# Patient Record
Sex: MALE | Race: WHITE | NOT HISPANIC OR LATINO | Employment: OTHER | URBAN - METROPOLITAN AREA
[De-identification: names, ages, dates, MRNs, and addresses within clinical notes are randomized per-mention and may not be internally consistent; named-entity substitution may affect disease eponyms.]

---

## 2018-03-16 ENCOUNTER — OFFICE VISIT (OUTPATIENT)
Dept: GASTROENTEROLOGY | Facility: MEDICAL CENTER | Age: 78
End: 2018-03-16
Payer: MEDICARE

## 2018-03-16 VITALS
BODY MASS INDEX: 35.22 KG/M2 | SYSTOLIC BLOOD PRESSURE: 124 MMHG | TEMPERATURE: 98 F | DIASTOLIC BLOOD PRESSURE: 64 MMHG | HEIGHT: 67 IN | WEIGHT: 224.4 LBS | HEART RATE: 66 BPM

## 2018-03-16 DIAGNOSIS — C49.A4 GIST (GASTROINTESTINAL STROMA TUMOR), MALIGNANT, COLON (HCC): ICD-10-CM

## 2018-03-16 DIAGNOSIS — K29.80 DUODENITIS: Primary | ICD-10-CM

## 2018-03-16 DIAGNOSIS — I35.0 AORTIC VALVE STENOSIS, ETIOLOGY OF CARDIAC VALVE DISEASE UNSPECIFIED: ICD-10-CM

## 2018-03-16 PROCEDURE — 99214 OFFICE O/P EST MOD 30 MIN: CPT | Performed by: INTERNAL MEDICINE

## 2018-03-16 RX ORDER — ARIPIPRAZOLE 5 MG/1
5 TABLET ORAL
COMMUNITY

## 2018-03-16 RX ORDER — ENALAPRIL MALEATE 10 MG/1
TABLET ORAL
COMMUNITY

## 2018-03-16 NOTE — LETTER
March 17, 2018     Kristi Hew, [address]    Patient: Aruna Thomas   YOB: 1940   Date of Visit: 3/16/2018       Dear Dr Altagracia Martinez:    Thank you for referring Jose Alfredo Torrez to me for evaluation  Below are my notes for this consultation  If you have questions, please do not hesitate to call me  I look forward to following your patient along with you           Sincerely,        Julianne Boyer MD        CC: No Recipients

## 2018-03-16 NOTE — PROGRESS NOTES
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 on MRI and CT scan performed at [facility]   This was performed last year  Decided to just was approximately 2 5 cm  It was recommended to have endoscopic ultrasound for further evaluation  He presents here for this  He does have extensive cardiac history with moderate aortic stenosis  He is seeing a cardiologist soon for further evaluation of this as well  After extensive discussion and reviewing records from [facility] and from the [facility] and discussing risk and benefit of undergoing endoscopic ultrasound he would like to defer this at this time  We did discuss that he needs further evaluation after cardiac issues have resolved or bowel replacement has taken place  We further discussed that this may be technically difficult due to the fact that he will be on anticoagulation but the biopsies may not have to be performed if this is a classical just on endoscopic ultrasound  EGD which was performed in January of this year was also reviewed  This did not identify any masses in the greater curvature  This did identify duodenitis and he needs further evaluation to rule out celiac disease  Follow-up in 6 months with us  Duodenitis- he denies any NSAID use the etiology of this is unclear may be related to GERD versus celiac disease  It may also be a nonspecific finding  He does have mild villi blunting on endoscopic evaluation with biopsy  Would recommend repeat EGD at the time of endoscopic ultrasound  When he will follow up with us in 6 months we could discuss this further  Will recommend checking for celiac serologies at this time  If they are positive I would recommend avoiding gluten      Aortic stenosis- he is planning on seeing a cardiologist for this so would defer endoscopic ultrasound for now   ______________________________________________________________________    HPI:      Nhung Thompson is a 50-year-old male who is a poor historian has most of his care at the [address] [place] [address] and recently also had multiple imaging study performed at [facility] presents here for evaluation of chest, and duodenitis  He states he is up-to-date on his colonoscopy evaluation but those records are not available to us  He had multiple imaging studies done MRI/CT scan of abdomen pelvis which identified a submucosal lesion possibly located in the greater curvature of the stomach  This was measuring approximately 2 5 cm and 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 GIST  He is asymptomatic at this time  He also underwent EGD evaluation in January 2018 and PET scan for further evaluation of this lesion  PET scan did not identify any lesion in the greater curvature and EGD also did not identify lesion  He does need endoscopic ultrasound for further evaluation of a possible submucosal lesion  Unfortunately he does have extensive cardiac issues including moderate aortic stenosis with a preserved EF  He is following with Cardiology for further evaluation considering possible valve replacement  After extensive discussion regarding risk and benefit endoscopic ultrasound he would like to defer this until cardiology evaluation has taken place  He is up-to-date on his colonoscopy evaluation and will follow with the [address] [place] [address] for this  REVIEW OF SYSTEMS:    CONSTITUTIONAL:  Well appearing  HEENT: No earache or tinnitus  Denies hearing loss or visual disturbances  CARDIOVASCULAR: No chest pain or palpitations  RESPIRATORY: Denies any cough, hemoptysis, shortness of breath or dyspnea on exertion  GASTROINTESTINAL: As noted in the History of Present Illness  GENITOURINARY: No problems with urination  Denies any hematuria or dysuria  NEUROLOGIC: No dizziness or vertigo, denies headaches  MUSCULOSKELETAL: Denies any muscle or joint pain     SKIN: Denies skin rashes or itching  ENDOCRINE: Denies excessive thirst  Denies intolerance to heat or cold  PSYCHOSOCIAL: Denies depression or anxiety  Denies any recent memory loss  Historical Information   History reviewed  No pertinent past medical history  History reviewed  No pertinent surgical history  Social History   History   Alcohol use Not on file     History   Drug use: Unknown     History   Smoking Status    Current Every Day Smoker   Smokeless Tobacco    Never Used     History reviewed  No pertinent family history  Meds/Allergies       Current Outpatient Prescriptions:     ARIPiprazole (ABILIFY) 5 mg tablet    enalapril (VASOTEC) 10 mg tablet    No Known Allergies        Objective     Blood pressure 124/64, pulse 66, temperature 98 °F (36 7 °C), temperature source Oral, height 5' 7" (1 702 m), weight 102 kg (224 lb 6 4 oz)  PHYSICAL EXAM:      General Appearance:   Alert, cooperative, no distress   HEENT:   Normocephalic, atraumatic, anicteric      Neck:  Supple, symmetrical, trachea midline   Lungs:   Clear to auscultation bilaterally; no rales, rhonchi or wheezing; respirations unlabored    Heart[de-identified]   2/6 systolic ejection murmur   Abdomen:   Soft, non-tender, non-distended; normal bowel sounds; no masses, no organomegaly    Genitalia:   Deferred    Rectal:   Deferred    Extremities:  No cyanosis, clubbing or edema    Pulses:  2+ and symmetric    Skin:  No jaundice, rashes, or lesions    Lymph nodes:  No palpable cervical lymphadenopathy        Lab Results:   No visits with results within 1 Day(s) from this visit  Latest known visit with results is:   Conversion Encounter Outpatient on 01/15/2015   Component Date Value    PSA 01/15/2015 1 2          Radiology Results:   7/2017 - MRI examination of abdomen before and after administration of 9 5 mL  of Gadavist  MRCP images were also obtained  Comparison was performed with  prior CT scan of July 30, 2017  Findings:  There are motion artifacts, somewhat limiting the quality of the  study  Liver, pancreas, spleen, adrenal glands, and kidneys are unremarkable  There is a subcentimeter unenhancing cystic lesion in the posterior aspect of  the lower pole of the right kidney  No retroperitoneal lymphadenopathy  No  ascites  There is a 2 1 8 centimeters slightly T2 bright, T1 isointense diffuse  enhancing soft tissue mass adjacent to the greater curvature of the stomach,  which appears otherwise unremarkable  No evidence of stomach wall involvement  or intraluminal extension  Other close by organs are unremarkable  MRCP: There are several large and stones within the gallbladder, largest stone  2 1 x 1 5 cm in the gallbladder neck, corresponding to the mass seen on prior  CT  No intra-or extrahepatic biliary dilation  CBD is normal in caliber and  extend to the major papillar without obstruction or filling defect  The main  pancreatic duct is of normal caliber pancreas divisum  There is a tiny cystic  lesion in the tail of the pancreas  6/2017- No evidence of metastatic disease within the abdomen or pelvis  2   A 6 mm mural nodule in the gallbladder wall which may represent a polyp  This could be further evaluated with ultrasound or MRI if clinically indicated  3   A 2 6 cm soft tissue mass arising from the greater curvature wall of the  stomach likely representing a GIST  Further evaluation is warranted  4   Cholelithiasis without evidence of cholecystitis  5   Dense aortic valve calcifications which can contribute to aortic stenosis

## 2018-05-23 ENCOUNTER — HOSPITAL ENCOUNTER (EMERGENCY)
Facility: HOSPITAL | Age: 78
Discharge: LEFT BEFORE DISCHARGE | End: 2018-05-24
Attending: EMERGENCY MEDICINE
Payer: MEDICARE

## 2018-05-23 DIAGNOSIS — R06.00 DYSPNEA AND RESPIRATORY ABNORMALITIES: Primary | ICD-10-CM

## 2018-05-23 DIAGNOSIS — R06.89 DYSPNEA AND RESPIRATORY ABNORMALITIES: Primary | ICD-10-CM

## 2018-05-23 DIAGNOSIS — Z53.29 LEFT AGAINST MEDICAL ADVICE: ICD-10-CM

## 2018-05-23 DIAGNOSIS — J18.9 PNEUMONITIS: ICD-10-CM

## 2018-05-24 ENCOUNTER — APPOINTMENT (EMERGENCY)
Dept: RADIOLOGY | Facility: HOSPITAL | Age: 78
End: 2018-05-24
Payer: MEDICARE

## 2018-05-24 VITALS
RESPIRATION RATE: 21 BRPM | TEMPERATURE: 98.3 F | HEART RATE: 88 BPM | SYSTOLIC BLOOD PRESSURE: 149 MMHG | DIASTOLIC BLOOD PRESSURE: 69 MMHG | OXYGEN SATURATION: 90 %

## 2018-05-24 LAB
ALBUMIN SERPL BCP-MCNC: 3.7 G/DL (ref 3.5–5)
ALP SERPL-CCNC: 79 U/L (ref 46–116)
ALT SERPL W P-5'-P-CCNC: 36 U/L (ref 12–78)
ANION GAP SERPL CALCULATED.3IONS-SCNC: 5 MMOL/L (ref 4–13)
AST SERPL W P-5'-P-CCNC: 25 U/L (ref 5–45)
BASOPHILS # BLD AUTO: 0.04 THOUSANDS/ΜL (ref 0–0.1)
BASOPHILS NFR BLD AUTO: 0 % (ref 0–1)
BILIRUB SERPL-MCNC: 0.66 MG/DL (ref 0.2–1)
BUN SERPL-MCNC: 18 MG/DL (ref 5–25)
CALCIUM SERPL-MCNC: 8.6 MG/DL (ref 8.3–10.1)
CHLORIDE SERPL-SCNC: 102 MMOL/L (ref 100–108)
CO2 SERPL-SCNC: 30 MMOL/L (ref 21–32)
CREAT SERPL-MCNC: 1.06 MG/DL (ref 0.6–1.3)
EOSINOPHIL # BLD AUTO: 0.28 THOUSAND/ΜL (ref 0–0.61)
EOSINOPHIL NFR BLD AUTO: 3 % (ref 0–6)
ERYTHROCYTE [DISTWIDTH] IN BLOOD BY AUTOMATED COUNT: 13.9 % (ref 11.6–15.1)
GFR SERPL CREATININE-BSD FRML MDRD: 68 ML/MIN/1.73SQ M
GLUCOSE SERPL-MCNC: 160 MG/DL (ref 65–140)
HCT VFR BLD AUTO: 43.8 % (ref 36.5–49.3)
HGB BLD-MCNC: 14.3 G/DL (ref 12–17)
LYMPHOCYTES # BLD AUTO: 0.89 THOUSANDS/ΜL (ref 0.6–4.47)
LYMPHOCYTES NFR BLD AUTO: 10 % (ref 14–44)
MCH RBC QN AUTO: 28.4 PG (ref 26.8–34.3)
MCHC RBC AUTO-ENTMCNC: 32.6 G/DL (ref 31.4–37.4)
MCV RBC AUTO: 87 FL (ref 82–98)
MONOCYTES # BLD AUTO: 0.76 THOUSAND/ΜL (ref 0.17–1.22)
MONOCYTES NFR BLD AUTO: 8 % (ref 4–12)
NEUTROPHILS # BLD AUTO: 7.27 THOUSANDS/ΜL (ref 1.85–7.62)
NEUTS SEG NFR BLD AUTO: 79 % (ref 43–75)
PLATELET # BLD AUTO: 280 THOUSANDS/UL (ref 149–390)
PMV BLD AUTO: 9 FL (ref 8.9–12.7)
POTASSIUM SERPL-SCNC: 3.9 MMOL/L (ref 3.5–5.3)
PROT SERPL-MCNC: 6.9 G/DL (ref 6.4–8.2)
RBC # BLD AUTO: 5.03 MILLION/UL (ref 3.88–5.62)
SODIUM SERPL-SCNC: 137 MMOL/L (ref 136–145)
WBC # BLD AUTO: 9.24 THOUSAND/UL (ref 4.31–10.16)

## 2018-05-24 PROCEDURE — 80053 COMPREHEN METABOLIC PANEL: CPT | Performed by: EMERGENCY MEDICINE

## 2018-05-24 PROCEDURE — 71046 X-RAY EXAM CHEST 2 VIEWS: CPT

## 2018-05-24 PROCEDURE — 96374 THER/PROPH/DIAG INJ IV PUSH: CPT

## 2018-05-24 PROCEDURE — 94640 AIRWAY INHALATION TREATMENT: CPT

## 2018-05-24 PROCEDURE — 85025 COMPLETE CBC W/AUTO DIFF WBC: CPT | Performed by: EMERGENCY MEDICINE

## 2018-05-24 PROCEDURE — 99283 EMERGENCY DEPT VISIT LOW MDM: CPT

## 2018-05-24 RX ORDER — IPRATROPIUM BROMIDE AND ALBUTEROL SULFATE 2.5; .5 MG/3ML; MG/3ML
3 SOLUTION RESPIRATORY (INHALATION) ONCE
Status: COMPLETED | OUTPATIENT
Start: 2018-05-24 | End: 2018-05-24

## 2018-05-24 RX ORDER — GUAIFENESIN 600 MG
600 TABLET, EXTENDED RELEASE 12 HR ORAL EVERY 12 HOURS SCHEDULED
Qty: 20 TABLET | Refills: 0 | Status: SHIPPED | OUTPATIENT
Start: 2018-05-24 | End: 2019-02-05 | Stop reason: ALTCHOICE

## 2018-05-24 RX ORDER — AZITHROMYCIN 250 MG/1
500 TABLET, FILM COATED ORAL ONCE
Status: COMPLETED | OUTPATIENT
Start: 2018-05-24 | End: 2018-05-24

## 2018-05-24 RX ORDER — METHYLPREDNISOLONE SODIUM SUCCINATE 125 MG/2ML
125 INJECTION, POWDER, LYOPHILIZED, FOR SOLUTION INTRAMUSCULAR; INTRAVENOUS ONCE
Status: COMPLETED | OUTPATIENT
Start: 2018-05-24 | End: 2018-05-24

## 2018-05-24 RX ORDER — PREDNISONE 20 MG/1
TABLET ORAL
Qty: 12 TABLET | Refills: 0 | Status: SHIPPED | OUTPATIENT
Start: 2018-05-24 | End: 2019-02-05 | Stop reason: ALTCHOICE

## 2018-05-24 RX ORDER — AZITHROMYCIN 250 MG/1
TABLET, FILM COATED ORAL
Qty: 6 TABLET | Refills: 0 | Status: SHIPPED | OUTPATIENT
Start: 2018-05-24 | End: 2018-05-29

## 2018-05-24 RX ORDER — IPRATROPIUM BROMIDE AND ALBUTEROL SULFATE 2.5; .5 MG/3ML; MG/3ML
SOLUTION RESPIRATORY (INHALATION)
Status: DISCONTINUED
Start: 2018-05-24 | End: 2018-05-24 | Stop reason: HOSPADM

## 2018-05-24 RX ORDER — METHYLPREDNISOLONE SODIUM SUCCINATE 125 MG/2ML
INJECTION, POWDER, LYOPHILIZED, FOR SOLUTION INTRAMUSCULAR; INTRAVENOUS
Status: DISCONTINUED
Start: 2018-05-24 | End: 2018-05-24 | Stop reason: HOSPADM

## 2018-05-24 RX ORDER — ALBUTEROL SULFATE 90 UG/1
2 AEROSOL, METERED RESPIRATORY (INHALATION) ONCE
Status: COMPLETED | OUTPATIENT
Start: 2018-05-24 | End: 2018-05-24

## 2018-05-24 RX ADMIN — IPRATROPIUM BROMIDE AND ALBUTEROL SULFATE 3 ML: .5; 3 SOLUTION RESPIRATORY (INHALATION) at 01:01

## 2018-05-24 RX ADMIN — METHYLPREDNISOLONE SODIUM SUCCINATE 125 MG: 125 INJECTION, POWDER, FOR SOLUTION INTRAMUSCULAR; INTRAVENOUS at 01:08

## 2018-05-24 RX ADMIN — ALBUTEROL SULFATE 2 PUFF: 90 AEROSOL, METERED RESPIRATORY (INHALATION) at 03:04

## 2018-05-24 RX ADMIN — AZITHROMYCIN 500 MG: 250 TABLET, FILM COATED ORAL at 03:03

## 2018-05-24 NOTE — ED PROVIDER NOTES
History  Chief Complaint   Patient presents with    URI     onset  of symptoms on friday, cough ,congestion ,white mucous,       Pt gives hx of cough/congestion and sore throat 5 days   Today "felt hot"  No chills  No N/V/D  No CP or Abd pain  PMH Cancer; AR; daily smoker  Pt relates about 1 month ago had pneumothorax after a procedure  Pt appears dyspneic and O2 Sat 86-92 on RA  History provided by:  Patient  Shortness of Breath   Severity:  Moderate  Progression:  Worsening  Chronicity:  New  Context: activity and URI    Context: not animal exposure and not fumes    Relieved by:  Rest  Worsened by:  Exertion  Ineffective treatments:  None tried  Associated symptoms: sore throat and wheezing    Associated symptoms: no abdominal pain, no chest pain, no claudication, no diaphoresis, no ear pain, no headaches, no hemoptysis, no neck pain, no rash, no sputum production, no syncope and no vomiting    Risk factors: hx of cancer and tobacco use    Risk factors: no hx of PE/DVT and no prolonged immobilization        Prior to Admission Medications   Prescriptions Last Dose Informant Patient Reported? Taking? ARIPiprazole (ABILIFY) 5 mg tablet  Self Yes No   Sig: Take 5 mg by mouth   enalapril (VASOTEC) 10 mg tablet  Self Yes No   Sig: Take by mouth      Facility-Administered Medications: None       Past Medical History:   Diagnosis Date    Cardiac disease     needs aortic valve    Prostate disease        Past Surgical History:   Procedure Laterality Date    BRAIN SURGERY      tumor removal 15 yrs ago       History reviewed  No pertinent family history  I have reviewed and agree with the history as documented  Social History   Substance Use Topics    Smoking status: Current Every Day Smoker    Smokeless tobacco: Never Used    Alcohol use No        Review of Systems   Constitutional: Positive for activity change  Negative for appetite change, chills and diaphoresis     HENT: Positive for congestion and sore throat  Negative for drooling, ear pain, facial swelling, mouth sores, rhinorrhea and sneezing  Eyes: Negative  Negative for discharge, redness and visual disturbance  Respiratory: Positive for chest tightness, shortness of breath and wheezing  Negative for hemoptysis, sputum production and choking  Cardiovascular: Negative  Negative for chest pain, palpitations, claudication, leg swelling and syncope  Gastrointestinal: Negative  Negative for abdominal pain, blood in stool, diarrhea, nausea and vomiting  Genitourinary: Negative  Negative for dysuria, flank pain and hematuria  Musculoskeletal: Negative  Negative for arthralgias, joint swelling, myalgias, neck pain and neck stiffness  Skin: Negative  Negative for rash and wound  Neurological: Negative  Negative for dizziness, tremors, seizures, syncope, facial asymmetry, speech difficulty and headaches  Psychiatric/Behavioral: Negative  Negative for confusion, hallucinations and self-injury  The patient is not nervous/anxious  All other systems reviewed and are negative  Physical Exam  Physical Exam   Constitutional: He is oriented to person, place, and time  He appears well-developed and well-nourished  He is cooperative  Non-toxic appearance  He does not have a sickly appearance  HENT:   Head: Normocephalic and atraumatic  Right Ear: Tympanic membrane and ear canal normal    Left Ear: Tympanic membrane and ear canal normal    Nose: Nose normal    Mouth/Throat: Mucous membranes are not dry  Posterior oropharyngeal erythema present  No oropharyngeal exudate or posterior oropharyngeal edema  Eyes: Conjunctivae, EOM and lids are normal  Pupils are equal, round, and reactive to light  Neck: Full passive range of motion without pain and phonation normal  Neck supple  No JVD present  Cardiovascular: Normal rate, regular rhythm and intact distal pulses  No extrasystoles are present     No perf edema or calf tenderness   Pulmonary/Chest: No stridor  Tachypnea noted  He has decreased breath sounds  He has wheezes (exp)  He has no rhonchi  He has no rales  Abdominal: Soft  Bowel sounds are normal  There is no rigidity, no guarding and no CVA tenderness  Lymphadenopathy:     He has no cervical adenopathy  Neurological: He is alert and oriented to person, place, and time  He has normal strength  He displays no tremor  No cranial nerve deficit  He displays no Babinski's sign on the right side  He displays no Babinski's sign on the left side  Skin: Skin is warm and dry  No petechiae and no rash noted  He is not diaphoretic  No cyanosis or erythema  Psychiatric: He has a normal mood and affect  His speech is normal and behavior is normal  Cognition and memory are normal    Vitals reviewed        Vital Signs  ED Triage Vitals [05/24/18 0002]   Temperature Pulse Respirations Blood Pressure SpO2   98 3 °F (36 8 °C) 100 18 140/62 90 %      Temp Source Heart Rate Source Patient Position - Orthostatic VS BP Location FiO2 (%)   Temporal Monitor Sitting Right arm --      Pain Score       --           Vitals:    05/24/18 0002 05/24/18 0300   BP: 140/62 149/69   Pulse: 100 88   Patient Position - Orthostatic VS: Sitting Standing       Visual Acuity      ED Medications  Medications   methylPREDNISolone sodium succinate (Solu-MEDROL) injection 125 mg (125 mg Intravenous Given During Downtime 5/24/18 0108)   ipratropium-albuterol (DUO-NEB) 0 5-2 5 mg/3 mL inhalation solution 3 mL (3 mL Nebulization Given During Downtime 5/24/18 0101)   albuterol (PROVENTIL HFA,VENTOLIN HFA) inhaler 2 puff (2 puffs Inhalation Given 5/24/18 0304)   azithromycin (ZITHROMAX) tablet 500 mg (500 mg Oral Given 5/24/18 0303)       Diagnostic Studies  Results Reviewed     Procedure Component Value Units Date/Time    Comprehensive metabolic panel [[id number]]  (Abnormal) Collected:  05/24/18 0055    Lab Status:  Final result Specimen:  Blood Updated: 05/24/18 0247     Sodium 137 mmol/L      Potassium 3 9 mmol/L      Chloride 102 mmol/L      CO2 30 mmol/L      Anion Gap 5 mmol/L      BUN 18 mg/dL      Creatinine 1 06 mg/dL      Glucose 160 (H) mg/dL      Calcium 8 6 mg/dL      AST 25 U/L      ALT 36 U/L      Alkaline Phosphatase 79 U/L      Total Protein 6 9 g/dL      Albumin 3 7 g/dL      Total Bilirubin 0 66 mg/dL      eGFR 68 ml/min/1 73sq m     Narrative:         National Kidney Disease Education Program recommendations are as follows:  GFR calculation is accurate only with a steady state creatinine  Chronic Kidney disease less than 60 ml/min/1 73 sq  meters  Kidney failure less than 15 ml/min/1 73 sq  meters  CBC and differential [[id number]]  (Abnormal) Resulted:  05/24/18 0244    Lab Status:  Final result Specimen:  Blood Updated:  05/24/18 0244     WBC 9 24 Thousand/uL      RBC 5 03 Million/uL      Hemoglobin 14 3 g/dL      Hematocrit 43 8 %      MCV 87 fL      MCH 28 4 pg      MCHC 32 6 g/dL      RDW 13 9 %      MPV 9 0 fL      Platelets 547 Thousands/uL      Neutrophils Relative 79 (H) %      Lymphocytes Relative 10 (L) %      Monocytes Relative 8 %      Eosinophils Relative 3 %      Basophils Relative 0 %      Neutrophils Absolute 7 27 Thousands/µL      Lymphocytes Absolute 0 89 Thousands/µL      Monocytes Absolute 0 76 Thousand/µL      Eosinophils Absolute 0 28 Thousand/µL      Basophils Absolute 0 04 Thousands/µL                  XR chest 2 views   ED Interpretation by Bryan Bui DO (05/24 0245)   Possible increased markings right lower lobe                 Procedures  Procedures       Phone Contacts  ED Phone Contact    ED Course  ED Course as of May 24 0448   Thu May 24, 2018   0247 Pt with 2 L O2 only 91%- with activity 89-90  On RA pt had dropped to 84% with any activity Long discussion with pt on admit - continually refuses  Pt understands to return if worsening or changes mind  Pt signing [place]  Wants meds to go home       Discussed work up for possible PE- pt wants to leave at this time  Pt understands morbidity assoc with hypoxia and PE, etc                               [facility]  CritCare Time    Disposition  Final diagnoses:   Dyspnea and respiratory abnormalities   Pneumonitis   Left against medical advice     Time reflects when diagnosis was documented in both MDM as applicable and the Disposition within this note     Time User Action Codes Description Comment    5/24/2018  2:54 AM Adriana Bores Add [R06 00,  R06 89] Dyspnea and respiratory abnormalities     5/24/2018  4:47 AM Adriana Bores Add [J18 9] Pneumonitis     5/24/2018  4:48 AM Adriana Bores Add [Z53 20] Left against medical advice       ED Disposition     ED Disposition Condition Comment    AMA  Date: 5/24/2018  Patient: Regina Thurman  Admitted: 5/23/2018 11:49 PM  Attending Provider: Debbie Membreno DO    Regina Thurman or his authorized caregiver has made the decision for the patient to leave the emergency department against the advice of  the emergency department staff  He or his authorized caregiver has been informed and understands the inherent risks, including death, Hypoxia, MI , CVA  Lynita Ped He or his authorized caregiver has decided to accept the responsibility for this decision  Regina Thurman and all necessary parties have been advised that he may return for further evaluation or treatment  His condition at time of discharge was fair    Regina Thurman had current vital signs as follows:  /62 (BP Location: Right arm)   Pulse 100    Temp 98 3 °F (36 8 °C) (Temporal)   Resp 18         Follow-up Information     Follow up With Specialties Details Why Contact Info Additional [address], [place] Internal Medicine In 1 day  [address] [address]  [phone]       [facility] Emergency Department Emergency Medicine In 1 day  Ibrahima Harper 1946  [phone] [place] [facility], [address], [place], [place], [address]          Discharge Medication List as of 5/24/2018  2:56 AM      CONTINUE these medications which have NOT CHANGED    Details   ARIPiprazole (ABILIFY) 5 mg tablet Take 5 mg by mouth, Historical Med      enalapril (VASOTEC) 10 mg tablet Take by mouth, Historical Med           No discharge procedures on file      ED Provider  Electronically Signed by           Luci Davidson DO  05/24/18 0327

## 2018-05-24 NOTE — DISCHARGE INSTRUCTIONS
Use inhaler 2 puffs every 4 hrs  TAke medications as prescribed  Return immediately if you change your mind or worsening symptoms  ( [id number])                 Dyspnea   WHAT YOU NEED TO KNOW:   Dyspnea is breathing difficulty or discomfort  You may have labored, painful, or shallow breathing  You may feel breathless or short of breath  Dyspnea can occur during rest or with activity  You may have dyspnea for a short time, or it might become chronic  Dyspnea is often a symptom of a disease or condition  DISCHARGE INSTRUCTIONS:   Seek care immediately if:   · Your signs and symptoms are the same or worse within 24 hours of treatment  · You have shaking chills or a fever over 102°F      · You have new pain, pressure, or tightness in your chest      · You have a new or worse cough or wheezing, or you cough up blood  · You feel like you cannot get enough air  · The skin over your ribs or on your neck sinks in when you breathe  · You have a severe headache with vomiting and abdominal pain  · You feel confused or dizzy  Contact your healthcare provider or specialist if:   · You have questions or concerns about your condition or care  Medicines:   · Medicines  may be used to treat the cause of your dyspnea  Medicines may reduce swelling in your airway or decrease extra fluid from around your heart or lungs  Other medicines may be used to decrease anxiety and help you feel calm and relaxed  · Take your medicine as directed  Contact your healthcare provider if you think your medicine is not helping or if you have side effects  Tell him or her if you are allergic to any medicine  Keep a list of the medicines, vitamins, and herbs you take  Include the amounts, and when and why you take them  Bring the list or the pill bottles to follow-up visits  Carry your medicine list with you in case of an emergency  Manage long-term dyspnea:   · Create an action plan    You and your healthcare provider can work together to create a plan for how to handle episodes of dyspnea  The plan can include daily activities, treatment changes, and what to do if you have severe breathing problems  · Lean forward on your elbows when you sit  This helps your lungs expand and may make it easier to breathe  · Use pursed-lip breathing any time you feel short of breath  Breathe in through your nose and then slowly breathe out through your mouth with your lips slightly puckered  It should take you twice as long to breathe out as it did to breathe in  · Do not smoke  Nicotine and other chemicals in cigarettes and cigars can cause lung damage and make it harder to breathe  Ask your healthcare provider for information if you currently smoke and need help to quit  E-cigarettes or smokeless tobacco still contain nicotine  Talk to your healthcare provider before you use these products  · Reach or maintain a healthy weight  Your healthcare provider can help you create a safe weight loss plan if you are overweight  · Exercise as directed  Exercise can help your lungs work more easily  Exercise can also help you lose weight if needed  Try to get at least 30 minutes of exercise most days of the week  Your healthcare provider can help you create an exercise plan that is safe for you  Follow up with your healthcare provider or specialist as directed:  Write down your questions so you remember to ask them during your visits  © 2017 [employer]  Information is for End User's use only and may not be sold, redistributed or otherwise used for commercial purposes  All illustrations and images included in CareNotes® are the copyrighted property of [employer]  or [employer]  The above information is an  only  It is not intended as medical advice for individual conditions or treatments   Talk to your doctor, nurse or pharmacist before following any medical regimen to see if it is safe and effective for you  Hypoxia   WHAT YOU NEED TO KNOW:   What is hypoxia? Hypoxia is a decreased level of oxygen in all or part of your body, such as your brain  What causes hypoxia? Some conditions can cause hypoxia to occur suddenly  Other conditions may cause hypoxia to occur over time  Hypoxia may be caused by any of the following:  · Travel to a high altitude     · Near drowning or choking    · Carbon monoxide poisoning    · Exposure to cold for a long period of time    · Severe anemia    · Chronic lung disease, such as emphysema    · Congestive heart failure  What are the signs and symptoms of hypoxia? · Confusion or memory loss    · Clumsiness     · Drowsiness     · Changes in behavior     · Vision changes    · Bluish-gray lips or nails     · Dizziness, lightheadedness, or fainting  How is hypoxia diagnosed? · A pulse oximeter  is a device that measures the amount of oxygen in your blood  · Blood tests  may be done to measure blood gases, such as oxygen, acids, and carbon dioxide  Blood tests may also be used to find the cause of your hypoxia  How is hypoxia treated? Treatment depends on the cause of your hypoxia  Oxygen therapy will be used to help you breathe easier  You may also need medicines to treat the cause of your hypoxia  Mechanical ventilation and IV fluids may be used for more severe forms of hypoxia  A ventilator is a machine that gives you oxygen The ventilator breathes for you when you cannot breathe well on your own  When should I contact my healthcare provider? · Your muscles jerk  · You have questions or concerns about your condition or care  When should I seek immediate care or call 911? · You have a seizure  · You faint  · You are irritable, confused, or unusually drowsy  CARE AGREEMENT:   You have the right to help plan your care  Learn about your health condition and how it may be treated   Discuss treatment options with your caregivers to decide what care you want to receive  You always have the right to refuse treatment  The above information is an  only  It is not intended as medical advice for individual conditions or treatments  Talk to your doctor, nurse or pharmacist before following any medical regimen to see if it is safe and effective for you  © 2017 [employer] Information is for End User's use only and may not be sold, redistributed or otherwise used for commercial purposes  All illustrations and images included in CareNotes® are the copyrighted property of [employer]  or [employer]  Pneumonia   WHAT YOU NEED TO KNOW:   What is pneumonia? Pneumonia is an infection in your lungs caused by bacteria, viruses, fungi, or parasites  You can become infected if you come in contact with someone who is sick  You can get pneumonia if you recently had surgery or needed a ventilator to help you breathe  Pneumonia can also be caused by accidentally inhaling saliva or small pieces of food  Pneumonia may cause mild symptoms, or it can be severe and life-threatening  What increases my risk for pneumonia? · A cold or the flu    · Health conditions, such as heart or lung disease    · A weakened immune system caused by HIV, cancer, or steroid use    · Recent hospitalization    · Smoking    · Excess alcohol use    · Older age  What are the signs and symptoms of pneumonia? · Fever or chills    · Cough    · Shortness of breath or rapid breathing    · Chest pain when you cough or breathe deeply    · Headache    · Vomiting    · Fatigue or confusion  How is pneumonia diagnosed? Your healthcare provider will listen to your lungs  Tell him or her if you have other health conditions  Give your provider a complete list of all medicines you have taken recently  You may need any of the following:  · Blood tests  may show signs of an infection or the bacteria causing your pneumonia   Blood tests can also show how much oxygen is in your blood  · A chest x-ray  may show signs of infection in your lungs  · Pulse oximetry  measures the amount of oxygen in your blood  · A mucus sample  is collected and tested for the germ that is causing your illness  It can help your healthcare provider choose the best medicine to treat the infection  How is pneumonia treated? · Antibiotics  treat pneumonia caused by bacteria  · Acetaminophen  decreases pain and fever  It is available without a doctor's order  Ask how much to take and how often to take it  Follow directions  Read the labels of all other medicines you are using to see if they also contain acetaminophen, or ask your doctor or pharmacist  Acetaminophen can cause liver damage if not taken correctly  Do not use more than 4 grams (4,000 milligrams) total of acetaminophen in one day  · NSAIDs , such as ibuprofen, help decrease swelling, pain, and fever  This medicine is available with or without a doctor's order  NSAIDs can cause stomach bleeding or kidney problems in certain people  If you take blood thinner medicine, always ask your healthcare provider if NSAIDs are safe for you  Always read the medicine label and follow directions  · Airway clearance techniques  are exercises to help remove mucus so you can breathe more easily  Your healthcare provider will show you how to do the exercises  These exercises may be used along with machines or devices to help decrease your symptoms  · Respiratory support  is given to help you breathe  You may receive oxygen to increase the level of oxygen in your blood  You may also need a machine to help you breathe  How can I manage my symptoms? · Rest as needed  Rest often while you recover  Slowly start to do more each day  · Drink liquids as directed  Ask how much liquid to drink each day and which liquids are best for you  Liquids help thin your mucus, which may make it easier for you to cough it up       · Do not smoke  Avoid secondhand smoke  Smoking increases your risk for pneumonia  Smoking also makes it harder for you to get better after you have had pneumonia  Ask your healthcare provider for information if you currently smoke and need help to quit  E-cigarettes or smokeless tobacco still contain nicotine  Talk to your healthcare provider before you use these products  · Use a cool mist humidifier  A humidifier will help increase air moisture in your home  This may make it easier for you to breathe and help decrease your cough  · Keep your head elevated  You may be able to breathe better if you lie down with the head of your bed up  How can I prevent pneumonia? · Prevent the spread of germs  Wash your hands often with soap and water  Use gel hand cleanser when there is no soap and water available  Do not touch your eyes, nose, or mouth unless you have washed your hands first  Cover your mouth when you cough  Cough into a tissue or your shirtsleeve so you do not spread germs from your hands  If you are sick, stay away from others as much as possible  · Limit alcohol  Women should limit alcohol to 1 drink a day  Men should limit alcohol to 2 drinks a day  A drink of alcohol is 12 ounces of beer, 5 ounces of wine, or 1½ ounces of liquor  · Ask about vaccines  You may need a vaccine to help prevent pneumonia  Get an influenza (flu) vaccine every year as soon as it becomes available  When should I seek immediate care? · You cough up blood  · Your heart beats more than 100 beats in 1 minute  · You are very tired, confused, and cannot think clearly  · You have chest pain or trouble breathing  · Your lips or fingernails turn gray or blue  When should I contact my healthcare provider? · Your symptoms are the same or get worse 48 hours after you start antibiotics  · Your fever is not below 99°F (37 2°C) 48 hours after you start antibiotics       · You have a fever higher than 101°F (38 3°C)  · You cannot eat, or you have loss of appetite, nausea, or are vomiting  · You have questions or concerns about your condition or care  CARE AGREEMENT:   You have the right to help plan your care  Learn about your health condition and how it may be treated  Discuss treatment options with your caregivers to decide what care you want to receive  You always have the right to refuse treatment  The above information is an  only  It is not intended as medical advice for individual conditions or treatments  Talk to your doctor, nurse or pharmacist before following any medical regimen to see if it is safe and effective for you  © 2017 [employer] Information is for End User's use only and may not be sold, redistributed or otherwise used for commercial purposes  All illustrations and images included in CareNotes® are the copyrighted property of [employer]  or [employer]  Against Medical Advice   WHAT YOU NEED TO KNOW:   Discharge against medical advice means that you choose to leave the hospital before your healthcare provider recommends that you do  Your healthcare provider may still need to diagnose or treat your condition or your treatment may not be complete  DISCHARGE INSTRUCTIONS:   Risks:  Risks of leaving the hospital before your healthcare providers recommend include the following:  · Your condition may cause other health problems if not treated properly  · You may need to be admitted to the hospital again for the same condition  · Your condition could become life-threatening  Follow up with your healthcare provider as directed:  Write down your questions so you remember to ask them during your visits  © 2017 [employer] Information is for End User's use only and may not be sold, redistributed or otherwise used for commercial purposes   All illustrations and images included in CareNotes® are the copyrighted property of [employer]  or [employer]  The above information is an  only  It is not intended as medical advice for individual conditions or treatments  Talk to your doctor, nurse or pharmacist before following any medical regimen to see if it is safe and effective for you

## 2018-05-24 NOTE — ED NOTES
Patient placed on 2L O2 via nasal canula for O2 saturation of 84%  Dr Taz Loya made aware        Gonzalez Peraza RN  05/24/18 [id number]

## 2019-02-05 ENCOUNTER — APPOINTMENT (EMERGENCY)
Dept: CT IMAGING | Facility: HOSPITAL | Age: 79
End: 2019-02-05
Payer: MEDICARE

## 2019-02-05 ENCOUNTER — HOSPITAL ENCOUNTER (EMERGENCY)
Facility: HOSPITAL | Age: 79
Discharge: LEFT BEFORE DISCHARGE | End: 2019-02-05
Attending: EMERGENCY MEDICINE
Payer: MEDICARE

## 2019-02-05 VITALS
RESPIRATION RATE: 18 BRPM | SYSTOLIC BLOOD PRESSURE: 158 MMHG | DIASTOLIC BLOOD PRESSURE: 69 MMHG | TEMPERATURE: 97.5 F | HEART RATE: 84 BPM | OXYGEN SATURATION: 94 %

## 2019-02-05 DIAGNOSIS — I35.0 AORTIC VALVE STENOSIS, ETIOLOGY OF CARDIAC VALVE DISEASE UNSPECIFIED: Primary | ICD-10-CM

## 2019-02-05 DIAGNOSIS — I21.4 NSTEMI (NON-ST ELEVATED MYOCARDIAL INFARCTION) (HCC): ICD-10-CM

## 2019-02-05 LAB
ALBUMIN SERPL BCP-MCNC: 3.5 G/DL (ref 3.5–5)
ALP SERPL-CCNC: 80 U/L (ref 46–116)
ALT SERPL W P-5'-P-CCNC: 46 U/L (ref 12–78)
ANION GAP SERPL CALCULATED.3IONS-SCNC: 6 MMOL/L (ref 4–13)
APTT PPP: 29 SECONDS (ref 26–38)
AST SERPL W P-5'-P-CCNC: 26 U/L (ref 5–45)
BASOPHILS # BLD AUTO: 0.07 THOUSANDS/ΜL (ref 0–0.1)
BASOPHILS NFR BLD AUTO: 1 % (ref 0–1)
BILIRUB SERPL-MCNC: 0.5 MG/DL (ref 0.2–1)
BUN SERPL-MCNC: 21 MG/DL (ref 5–25)
CALCIUM SERPL-MCNC: 8.9 MG/DL (ref 8.3–10.1)
CHLORIDE SERPL-SCNC: 105 MMOL/L (ref 100–108)
CO2 SERPL-SCNC: 32 MMOL/L (ref 21–32)
CREAT SERPL-MCNC: 1.18 MG/DL (ref 0.6–1.3)
EOSINOPHIL # BLD AUTO: 0.45 THOUSAND/ΜL (ref 0–0.61)
EOSINOPHIL NFR BLD AUTO: 6 % (ref 0–6)
ERYTHROCYTE [DISTWIDTH] IN BLOOD BY AUTOMATED COUNT: 15.2 % (ref 11.6–15.1)
GFR SERPL CREATININE-BSD FRML MDRD: 59 ML/MIN/1.73SQ M
GLUCOSE SERPL-MCNC: 228 MG/DL (ref 65–140)
HCT VFR BLD AUTO: 35.5 % (ref 36.5–49.3)
HGB BLD-MCNC: 10.7 G/DL (ref 12–17)
IMM GRANULOCYTES # BLD AUTO: 0.03 THOUSAND/UL (ref 0–0.2)
IMM GRANULOCYTES NFR BLD AUTO: 0 % (ref 0–2)
INR PPP: 1.04 (ref 0.86–1.17)
LACTATE SERPL-SCNC: 1 MMOL/L (ref 0.5–2)
LYMPHOCYTES # BLD AUTO: 1.4 THOUSANDS/ΜL (ref 0.6–4.47)
LYMPHOCYTES NFR BLD AUTO: 17 % (ref 14–44)
MCH RBC QN AUTO: 26.8 PG (ref 26.8–34.3)
MCHC RBC AUTO-ENTMCNC: 30.1 G/DL (ref 31.4–37.4)
MCV RBC AUTO: 89 FL (ref 82–98)
MONOCYTES # BLD AUTO: 0.67 THOUSAND/ΜL (ref 0.17–1.22)
MONOCYTES NFR BLD AUTO: 8 % (ref 4–12)
NEUTROPHILS # BLD AUTO: 5.53 THOUSANDS/ΜL (ref 1.85–7.62)
NEUTS SEG NFR BLD AUTO: 68 % (ref 43–75)
NRBC BLD AUTO-RTO: 0 /100 WBCS
NT-PROBNP SERPL-MCNC: 442 PG/ML
PLATELET # BLD AUTO: 317 THOUSANDS/UL (ref 149–390)
PMV BLD AUTO: 8.9 FL (ref 8.9–12.7)
POTASSIUM SERPL-SCNC: 3.7 MMOL/L (ref 3.5–5.3)
PROT SERPL-MCNC: 6.6 G/DL (ref 6.4–8.2)
PROTHROMBIN TIME: 13.1 SECONDS (ref 11.8–14.2)
RBC # BLD AUTO: 3.99 MILLION/UL (ref 3.88–5.62)
SODIUM SERPL-SCNC: 143 MMOL/L (ref 136–145)
TROPONIN I SERPL-MCNC: 0.23 NG/ML
WBC # BLD AUTO: 8.15 THOUSAND/UL (ref 4.31–10.16)

## 2019-02-05 PROCEDURE — 99285 EMERGENCY DEPT VISIT HI MDM: CPT

## 2019-02-05 PROCEDURE — 83880 ASSAY OF NATRIURETIC PEPTIDE: CPT | Performed by: EMERGENCY MEDICINE

## 2019-02-05 PROCEDURE — 93005 ELECTROCARDIOGRAM TRACING: CPT

## 2019-02-05 PROCEDURE — 36415 COLL VENOUS BLD VENIPUNCTURE: CPT | Performed by: EMERGENCY MEDICINE

## 2019-02-05 PROCEDURE — 83605 ASSAY OF LACTIC ACID: CPT | Performed by: EMERGENCY MEDICINE

## 2019-02-05 PROCEDURE — 71275 CT ANGIOGRAPHY CHEST: CPT

## 2019-02-05 PROCEDURE — 84484 ASSAY OF TROPONIN QUANT: CPT | Performed by: EMERGENCY MEDICINE

## 2019-02-05 PROCEDURE — 85025 COMPLETE CBC W/AUTO DIFF WBC: CPT | Performed by: EMERGENCY MEDICINE

## 2019-02-05 PROCEDURE — 80053 COMPREHEN METABOLIC PANEL: CPT | Performed by: EMERGENCY MEDICINE

## 2019-02-05 PROCEDURE — 85730 THROMBOPLASTIN TIME PARTIAL: CPT | Performed by: EMERGENCY MEDICINE

## 2019-02-05 PROCEDURE — 85610 PROTHROMBIN TIME: CPT | Performed by: EMERGENCY MEDICINE

## 2019-02-05 RX ORDER — ASPIRIN 81 MG/1
324 TABLET, CHEWABLE ORAL ONCE
Status: COMPLETED | OUTPATIENT
Start: 2019-02-05 | End: 2019-02-05

## 2019-02-05 RX ADMIN — IOHEXOL 85 ML: 350 INJECTION, SOLUTION INTRAVENOUS at 04:51

## 2019-02-05 RX ADMIN — ALBUTEROL SULFATE 5 MG: 2.5 SOLUTION RESPIRATORY (INHALATION) at 04:02

## 2019-02-05 RX ADMIN — ASPIRIN 81 MG 324 MG: 81 TABLET ORAL at 04:53

## 2019-02-05 NOTE — ED NOTES
Dr Kiya Reis discussed in length with patient about elevated troponin and potential for worsening condition including heart attack and death  Patient still refusing transport to another facility  AMA papers signed at this time        Lidia Chill, RN  02/05/19 [id number]

## 2019-02-05 NOTE — DISCHARGE INSTRUCTIONS
Myocardial Infarction   WHAT YOU NEED TO KNOW:   A myocardial infarction (MI) is a heart attack  A heart attack happens when the blood vessels that supply blood to your heart (coronary arteries) are blocked  This can damage your heart  It can lead to an abnormal heart rhythm, heart failure, or may become life-threatening  DISCHARGE INSTRUCTIONS:   Medicines: You may  need any of the following:  · Heart medicines  help decrease blood pressure, control your heart rate, and help your heart function better  · Nitroglycerin  opens the arteries to your heart, increases oxygen levels, and can decrease chest pain  You may get your nitroglycerin as a pill, a patch, or a paste  Ask your healthcare provider or cardiologist how to safely take this medicine  · Aspirin  helps prevent clots from forming and causing blood flow problems  If healthcare providers want you to take aspirin daily, do not take acetaminophen or ibuprofen instead  Do not take more or less aspirin than healthcare providers say to take  If you are on another blood thinner medicine, ask your healthcare provider or cardiologist before you take aspirin for any reason  · Blood thinners    help prevent blood clots  Examples of blood thinners include heparin and warfarin  Clots can cause strokes, heart attacks, and death  The following are general safety guidelines to follow while you are taking a blood thinner:    ¨ Watch for bleeding and bruising while you take blood thinners  Watch for bleeding from your gums or nose  Watch for blood in your urine and bowel movements  Use a soft washcloth on your skin, and a soft toothbrush to brush your teeth  This can keep your skin and gums from bleeding  If you shave, use an electric shaver  Do not play contact sports  ¨ Tell your dentist and other healthcare providers that you take anticoagulants  Wear a bracelet or necklace that says you take this medicine       ¨ Do not start or stop any medicines unless your healthcare provider tells you to  Many medicines cannot be used with blood thinners  ¨ Tell your healthcare provider right away if you forget to take the medicine, or if you take too much  ¨ Warfarin  is a blood thinner that you may need to take  The following are things you should be aware of if you take warfarin  § Foods and medicines can affect the amount of warfarin in your blood  Do not make major changes to your diet while you take warfarin  Warfarin works best when you eat about the same amount of vitamin K every day  Vitamin K is found in green leafy vegetables and certain other foods  Ask for more information about what to eat when you are taking warfarin  § You will need to see your healthcare provider for follow-up visits when you are on warfarin  You will need regular blood tests  These tests are used to decide how much medicine you need  · Cholesterol medicine  decreases cholesterol and the amount of plaque in your blood  · Do not take certain medicines without asking your healthcare provider first   These include NSAIDs, herbal or vitamin supplements, or hormones (estrogen or progestin)  · Take your medicine as directed  Contact your healthcare provider if you think your medicine is not helping or if you have side effects  Tell him or her if you are allergic to any medicine  Keep a list of the medicines, vitamins, and herbs you take  Include the amounts, and when and why you take them  Bring the list or the pill bottles to follow-up visits  Carry your medicine list with you in case of an emergency  Cardiac rehabilitation (rehab)  is a program run by specialists who will help you safely strengthen your heart and prevent more heart disease  The plan includes exercise, relaxation, stress management, and heart-healthy nutrition  Healthcare providers will also check to make sure any medicines you take are working   The plan may also include instructions for when you can drive, return to work, and do other normal daily activities  Follow up with your healthcare provider or cardiologist within 14 days or as directed:  Ask for information about continuing care, treatments, and home services  Write down your questions so you remember to ask them during your visits  Lifestyle changes:   · Go to cardiac rehabilitation as directed  This is a program run by specialists who will help you safely strengthen your heart and prevent more heart disease  This plan includes exercise, relaxation, stress management, and heart-healthy nutrition  Healthcare providers will also check to make sure any medicines you are taking are working  The plan may also include instructions for when you can drive, return to work, and do other normal daily activities  · Eat a heart healthy diet  Get enough calories, protein, vitamins, and minerals to help prevent poor nutrition and promote muscle strength  You may be told to eat foods low in cholesterol or sodium (salt)  You also may be told to limit saturated and trans fats  Eat foods that contain healthy fats, such as walnuts, salmon, and canola and soybean oils  Eat foods that help protect the heart, including plenty of fruits and vegetables, nuts, and sources of fiber  · Do not smoke  If you smoke, it is never too late to quit  Smoking increases your risk of another MI      · Exercise  Ask your healthcare provider or cardiologist about the best exercise plan for you  Exercise makes your heart stronger, lowers blood pressure, and helps prevent an MI  The goal is 30 to 60 minutes a day, 5 to 7 days a week  Ask your healthcare provider or cardiologist how often and how long to exercise  · Maintain a healthy weight  Ask your healthcare provider or cardiologist how much you should weigh  Ask him to help you create a weight loss plan if you are overweight  · Manage your stress  Stress may slow healing and lead to illness   Learn ways to control stress, such as relaxation, deep breathing, and music  Talk to someone about things that upset you  · Get a flu vaccine  every year as soon as it is available  The vaccine will help prevent the flu  Ask about other vaccinations you may need  Contact your healthcare provider or cardiologist if:   · You have trouble taking your heart medicine  · You have questions or concerns about your condition or care  Seek care immediately or call 911 if:   · You have any of the following signs of a heart attack:      ¨ Squeezing, pressure, or pain in your chest that lasts longer than 5 minutes or returns    ¨ Discomfort or pain in your back, neck, jaw, stomach, or arm     ¨ Trouble breathing    ¨ Nausea or vomiting    ¨ Lightheadedness or a sudden cold sweat, especially with chest pain or trouble breathing    · You are tired and cannot think clearly  · Your heart is beating faster than usual      · You are bleeding from your gums or nose  · You see blood in your urine or bowel movements  · You urinate less than usual or not at all  · You have new or increased swelling in your feet or ankles  © 2017 [facility] Information is for End User's use only and may not be sold, redistributed or otherwise used for commercial purposes  All illustrations and images included in CareNotes® are the copyrighted property of [employer]  or [employer]  The above information is an  only  It is not intended as medical advice for individual conditions or treatments  Talk to your doctor, nurse or pharmacist before following any medical regimen to see if it is safe and effective for you  Aortic Stenosis   WHAT YOU NEED TO KNOW:   Aortic stenosis is a condition where the aortic valve in your heart is narrowed  The aortic valve is between the left ventricle and the aorta  The left ventricle is the lower left chamber of your heart   The aorta is a blood vessel that pumps blood to your head and body  The aortic valve opens and closes to direct blood flow through your heart  When the aortic valve is narrowed, blood flow may decrease  Your tissues and organs will not have enough oxygen and nutrients to function properly  WHILE YOU ARE HERE:   Informed consent  is a legal document that explains the tests, treatments, or procedures that you may need  Informed consent means you understand what will be done and can make decisions about what you want  You give your permission when you sign the consent form  You can have someone sign this form for you if you are not able to sign it  You have the right to understand your medical care in words you know  Before you sign the consent form, understand the risks and benefits of what will be done  Make sure all your questions are answered  Rest:  Keep the head of your bed raised to help you breathe easier  You can also raise your head and shoulders up on pillows or rest in a reclining chair  If you feel short of breath, let caregivers know right away  You may need extra oxygen  if your blood oxygen level is lower than it should be  You may get oxygen through a mask placed over your nose and mouth or through small tubes placed in your nostrils  Ask your healthcare provider before you take off the mask or oxygen tubing  Medicines:   · Cholesterol medicine: This medicine will help decrease the amount of cholesterol in your blood  · Antibiotics: This medicine will help fight or prevent an infection  You may need it if you had rheumatic fever in the past  You may need to take the medicine every day, or once a month  Tests:   · Blood tests: You may need blood taken to give caregivers information about how your body is working  The blood may be taken from your hand, arm, or IV  · Chest x-ray: This is used to check the size of your heart and look for fluid around your heart and lungs  · EKG:   This test records the electrical activity of your heart  It is used to check for abnormal heart rhythm caused by aortic stenosis  · An echocardiogram  is a type of ultrasound  Sound waves are used to show the structure and function of your heart  · A stress test  may show the changes that take place in your heart while it is under stress  Stress may be placed on your heart with exercise or medicine  Ask for more information about this test     · Cardiac catheterization: This procedure is done to find and treat heart blockages  A thin, bendable tube inserted is into your arm, neck, or groin and moved into your heart  An x-ray may be used to guide the tube to the right place  Dye may be put into your vein so the pictures show up better on a monitor  Tell the healthcare provider if you have ever had an allergic reaction to contrast dye  Treatment:   · Valvotomy: This helps widen your aortic valve and allow blood to flow through easier  A catheter (long thin tube) with a balloon on the tip is inserted through a small incision in your arm or groin  The catheter is guided through a blood vessel and into your left atrium near your aortic valve  When the balloon is inflated, it stretches the valve opening  · Valvuloplasty:  Healthcare providers make an incision in your chest to repair and widen your aortic valve  The valve walls are  or calcium buildup is removed  This helps improve the blood flow through your heart  · Replacement:  Healthcare providers make an incision in your chest to replace your damaged aortic valve  Part or all of your aortic valve is removed, and a new valve is secured in place  The new valve may be from a donor (another person or animal), or may be a manmade valve  RISKS:   Aortic stenosis may cause endocarditis  Endocarditis is an infection of the inner lining of the heart  Aortic stenosis can also cause congestive heart failure (CHF)  This is when the heart cannot pump enough blood for the body   This may cause irregular heartbeats and can lead to cardiac arrest (the heart stops beating)  This can be life-threatening  CARE AGREEMENT:   You have the right to help plan your care  Learn about your health condition and how it may be treated  Discuss treatment options with your caregivers to decide what care you want to receive  You always have the right to refuse treatment  © 2017 [address] [employer] Information is for End User's use only and may not be sold, redistributed or otherwise used for commercial purposes  All illustrations and images included in CareNotes® are the copyrighted property of [employer]  or [employer]  The above information is an  only  It is not intended as medical advice for individual conditions or treatments  Talk to your doctor, nurse or pharmacist before following any medical regimen to see if it is safe and effective for you

## 2019-02-05 NOTE — ED PROCEDURE NOTE
PROCEDURE  ECG 12 Lead Documentation  Date/Time: 2/5/2019 4:37 AM  Performed by: Khang Pettit  Authorized by: Khang Pettit     Indications / Diagnosis:  Short of breath   ECG reviewed by me, the ED Provider: yes    Patient location:  ED  Previous ECG:     Previous ECG:  Unavailable  Interpretation:     Interpretation: non-specific    Rate:     ECG rate:  74    ECG rate assessment: normal    Rhythm:     Rhythm: sinus rhythm    ST segments:     ST segments:  Non-specific         Casi Butler DO  02/05/19 3553

## 2019-02-05 NOTE — ED PROVIDER NOTES
History  Chief Complaint   Patient presents with    Shortness of Breath     onset 4 days ago with p[rogressive worsening, orthopnea, dyspnea on exertion     50-year-old male presents with shortness of breath which been present for the last 4-5 days  Patient has known history of a "bad heart valve "which he has not gotten fix this time  He states that he was unable lie flat today due to the shortness of breath  Patient also states that he has been wheezing and the patient is a smoker        History provided by:  Patient  Shortness of Breath   Severity:  Mild  Onset quality:  Gradual  Duration:  4 days  Timing:  Intermittent  Progression:  Waxing and waning  Chronicity:  Recurrent  Context: activity    Relieved by:  Rest and sitting up  Worsened by:  Nothing  Ineffective treatments:  None tried  Associated symptoms: no abdominal pain, no chest pain, no claudication, no cough, no diaphoresis, no headaches and no rash    Risk factors: no recent alcohol use and no family hx of DVT        Prior to Admission Medications   Prescriptions Last Dose Informant Patient Reported? Taking? ARIPiprazole (ABILIFY) 5 mg tablet  Self Yes Yes   Sig: Take 5 mg by mouth   enalapril (VASOTEC) 10 mg tablet  Self Yes Yes   Sig: Take by mouth      Facility-Administered Medications: None       Past Medical History:   Diagnosis Date    Cardiac disease     needs aortic valve    Prostate disease        Past Surgical History:   Procedure Laterality Date    BRAIN SURGERY      tumor removal 15 yrs ago       History reviewed  No pertinent family history  I have reviewed and agree with the history as documented  Social History   Substance Use Topics    Smoking status: Current Every Day Smoker    Smokeless tobacco: Never Used    Alcohol use No        Review of Systems   Constitutional: Negative for activity change, appetite change and diaphoresis  HENT: Negative for congestion, dental problem and drooling      Eyes: Negative for pain, discharge and itching  Respiratory: Positive for shortness of breath  Negative for cough  Cardiovascular: Negative for chest pain and claudication  Gastrointestinal: Negative for abdominal pain  Endocrine: Negative for cold intolerance, heat intolerance and polydipsia  Genitourinary: Negative for difficulty urinating, dysuria and enuresis  Musculoskeletal: Negative for arthralgias, back pain and gait problem  Skin: Negative for color change, pallor and rash  Allergic/Immunologic: Negative for environmental allergies and food allergies  Neurological: Negative for dizziness, facial asymmetry and headaches  Hematological: Negative for adenopathy  Psychiatric/Behavioral: Negative for agitation, behavioral problems and confusion  All other systems reviewed and are negative  Physical Exam  Physical Exam   Constitutional: He appears well-developed and well-nourished  HENT:   Head: Normocephalic  Right Ear: External ear normal    Left Ear: External ear normal    Mouth/Throat: Oropharynx is clear and moist    Eyes: Pupils are equal, round, and reactive to light  Right eye exhibits no discharge  Left eye exhibits no discharge  Neck: Normal range of motion  No tracheal deviation present  No thyromegaly present  Cardiovascular: Normal rate, regular rhythm and normal heart sounds  Pulmonary/Chest: No apnea  No respiratory distress  He has decreased breath sounds  He has wheezes in the right upper field and the left upper field  Abdominal: He exhibits no distension  There is no guarding  Musculoskeletal: Normal range of motion  He exhibits no edema or deformity  Neurological: He is alert  He displays normal reflexes  No cranial nerve deficit  Coordination normal    Skin: Skin is warm  Capillary refill takes less than 2 seconds  No erythema  Psychiatric: He has a normal mood and affect  His behavior is normal    Vitals reviewed        Vital Signs  ED Triage Vitals [02/05/19 6384] Temperature Pulse Respirations Blood Pressure SpO2   97 5 °F (36 4 °C) 84 18 158/69 94 %      Temp src Heart Rate Source Patient Position - Orthostatic VS BP Location FiO2 (%)   -- Monitor Sitting Left arm --      Pain Score       No Pain           Vitals:    02/05/19 0335   BP: 158/69   Pulse: 84   Patient Position - Orthostatic VS: Sitting       Visual Acuity      ED Medications  Medications   albuterol inhalation solution 5 mg (5 mg Nebulization Given 2/5/19 0402)   aspirin chewable tablet 324 mg (324 mg Oral Given 2/5/19 0453)   iohexol (OMNIPAQUE) 350 MG/ML injection (SINGLE-DOSE) 85 mL (85 mL Intravenous Given 2/5/19 0451)       Diagnostic Studies  Results Reviewed     Procedure Component Value Units Date/Time    Lactic acid, plasma [[id number]]  (Normal) Collected:  02/05/19 0344    Lab Status:  Final result Specimen:  Blood from Arm, Right Updated:  02/05/19 0448     LACTIC ACID 1 0 mmol/L     Narrative:         Result may be elevated if tourniquet was used during collection      Troponin I [[id number]]  (Abnormal) Collected:  02/05/19 0344    Lab Status:  Final result Specimen:  Blood from Arm, Right Updated:  02/05/19 0427     Troponin I 0 23 (HH) ng/mL     B-type natriuretic peptide [[id number]]  (Normal) Collected:  02/05/19 0344    Lab Status:  Final result Specimen:  Blood from Arm, Right Updated:  02/05/19 0419     NT-proBNP 442 pg/mL     Protime-INR [[id number]]  (Normal) Collected:  02/05/19 0344    Lab Status:  Final result Specimen:  Blood from Arm, Right Updated:  02/05/19 0414     Protime 13 1 seconds      INR 1 04    APTT [[id number]]  (Normal) Collected:  02/05/19 0344    Lab Status:  Final result Specimen:  Blood from Arm, Right Updated:  02/05/19 0414     PTT 29 seconds     Comprehensive metabolic panel [[id number]]  (Abnormal) Collected:  02/05/19 0344    Lab Status:  Final result Specimen:  Blood from Arm, Right Updated:  02/05/19 0411     Sodium 143 mmol/L      Potassium 3 7 mmol/L      Chloride 105 mmol/L      CO2 32 mmol/L      ANION GAP 6 mmol/L      BUN 21 mg/dL      Creatinine 1 18 mg/dL      Glucose 228 (H) mg/dL      Calcium 8 9 mg/dL      AST 26 U/L      ALT 46 U/L      Alkaline Phosphatase 80 U/L      Total Protein 6 6 g/dL      Albumin 3 5 g/dL      Total Bilirubin 0 50 mg/dL      eGFR 59 ml/min/1 73sq m     Narrative:         National Kidney Disease Education Program recommendations are as follows:  GFR calculation is accurate only with a steady state creatinine  Chronic Kidney disease less than 60 ml/min/1 73 sq  meters  Kidney failure less than 15 ml/min/1 73 sq  meters      CBC and differential [[id number]]  (Abnormal) Collected:  02/05/19 0344    Lab Status:  Final result Specimen:  Blood from Arm, Right Updated:  02/05/19 0357     WBC 8 15 Thousand/uL      RBC 3 99 Million/uL      Hemoglobin 10 7 (L) g/dL      Hematocrit 35 5 (L) %      MCV 89 fL      MCH 26 8 pg      MCHC 30 1 (L) g/dL      RDW 15 2 (H) %      MPV 8 9 fL      Platelets 708 Thousands/uL      nRBC 0 /100 WBCs      Neutrophils Relative 68 %      Immat GRANS % 0 %      Lymphocytes Relative 17 %      Monocytes Relative 8 %      Eosinophils Relative 6 %      Basophils Relative 1 %      Neutrophils Absolute 5 53 Thousands/µL      Immature Grans Absolute 0 03 Thousand/uL      Lymphocytes Absolute 1 40 Thousands/µL      Monocytes Absolute 0 67 Thousand/µL      Eosinophils Absolute 0 45 Thousand/µL      Basophils Absolute 0 07 Thousands/µL                  CTA ED chest PE study    (Results Pending)              Procedures  Procedures       Phone Contacts  ED Phone Contact    ED Course         HEART Risk Score      Most Recent Value   History  1 Filed at: 02/05/2019 0437   ECG  0 Filed at: 02/05/2019 7603   Age  2 Filed at: 02/05/2019 0437   Risk Factors  1 Filed at: 02/05/2019 0437   Troponin  1 Filed at: 02/05/2019 0437   Heart Score Risk Calculator   History  1 Filed at: 02/05/2019 0437   ECG  0 Filed at: 02/05/2019 4470   Age  2 Filed at: 02/05/2019 0437   Risk Factors  1 Filed at: 02/05/2019 0437   Troponin  1 Filed at: 02/05/2019 0437   HEART Score  5 Filed at: 02/05/2019 0437   HEART Score  5 Filed at: 02/05/2019 7576                            MDM  Number of Diagnoses or Management Options  Aortic valve stenosis, etiology of cardiac valve disease unspecified:   NSTEMI (non-ST elevated myocardial infarction) [facility]):   Diagnosis management comments: Differential diagnosis 1  Congestive heart failure 2  Pulmonary embolism 3  Pneumonia 4  COPD    04:34  I spoke with ANUP Rosenthal who asked that pt be transferred     0515  I spoke with patient at length about the elevation in his troponin and the fact these actively having a heart attack  Patient vehemently refuses to go to [place] stating "I got stuff my house I have to take care of and no body else can do for me "  I did have this conversation with nurse Malaika Figueroa at the bedside  Patient is aware of the fact that he is actively having myocardial ischemia and where the fact he may go home and die  Patient acknowledges that he understands this and refuses transfer or admission  I advised take patient take full-strength aspirin and return any time if he changes mind       Amount and/or Complexity of Data Reviewed  Clinical lab tests: reviewed  Tests in the radiology section of CPT®: reviewed  Tests in the medicine section of CPT®: reviewed    Risk of Complications, Morbidity, and/or Mortality  Presenting problems: high  Diagnostic procedures: high  Management options: high        Disposition  Final diagnoses:    Aortic valve stenosis, etiology of cardiac valve disease unspecified   NSTEMI (non-ST elevated myocardial infarction) [facility])     Time reflects when diagnosis was documented in both MDM as applicable and the Disposition within this note     Time User Action Codes Description Comment    2/5/2019  4:35 AM Trey Hercules Add [I35 0] Aortic valve stenosis, etiology of cardiac valve disease unspecified     2/5/2019  4:35 AM Emily Reed Modify [I35 0] Aortic valve stenosis, etiology of cardiac valve disease unspecified     2/5/2019  4:35 AM Nancy Lugo Add [I21 4] NSTEMI (non-ST elevated myocardial infarction) [facility])       ED Disposition     ED Disposition Condition Date/Time Comment    NATY Dykes Feb 5, 2019  5:16 AM       Follow-up Information    None         Patient's Medications   Discharge Prescriptions    No medications on file     No discharge procedures on file      ED Provider  Electronically Signed by           Brandon Nunez DO  02/05/19 6268

## 2019-02-06 LAB
ATRIAL RATE: 74 BPM
P AXIS: 66 DEGREES
PR INTERVAL: 138 MS
QRS AXIS: 55 DEGREES
QRSD INTERVAL: 108 MS
QT INTERVAL: 426 MS
QTC INTERVAL: 472 MS
T WAVE AXIS: 72 DEGREES
VENTRICULAR RATE: 74 BPM

## 2019-02-06 PROCEDURE — 93010 ELECTROCARDIOGRAM REPORT: CPT | Performed by: INTERNAL MEDICINE

## 2019-06-04 ENCOUNTER — TELEPHONE (OUTPATIENT)
Dept: GASTROENTEROLOGY | Facility: HOSPITAL | Age: 79
End: 2019-06-04

## 2019-09-18 ENCOUNTER — OFFICE VISIT (OUTPATIENT)
Dept: GASTROENTEROLOGY | Facility: HOSPITAL | Age: 79
End: 2019-09-18
Payer: COMMERCIAL

## 2019-09-18 VITALS
DIASTOLIC BLOOD PRESSURE: 70 MMHG | SYSTOLIC BLOOD PRESSURE: 185 MMHG | WEIGHT: 205.4 LBS | HEIGHT: 67 IN | TEMPERATURE: 97.4 F | HEART RATE: 54 BPM | BODY MASS INDEX: 32.24 KG/M2

## 2019-09-18 DIAGNOSIS — C49.A4 GIST (GASTROINTESTINAL STROMA TUMOR), MALIGNANT, COLON (HCC): ICD-10-CM

## 2019-09-18 DIAGNOSIS — D64.9 ANEMIA, UNSPECIFIED TYPE: ICD-10-CM

## 2019-09-18 DIAGNOSIS — K29.80 DUODENITIS: Primary | ICD-10-CM

## 2019-09-18 DIAGNOSIS — K59.00 CONSTIPATION, UNSPECIFIED CONSTIPATION TYPE: ICD-10-CM

## 2019-09-18 PROCEDURE — 99214 OFFICE O/P EST MOD 30 MIN: CPT | Performed by: PHYSICIAN ASSISTANT

## 2019-09-18 NOTE — PROGRESS NOTES
[facility] - Outpatient Follow-up Note  Brandan Sneed 67 y o  male MRN: [id number]  Encounter: [id number]          ASSESSMENT AND PLAN:      1  Duodenitis: seen on EGD 1/2018 with some villous blunting  Will check a celiac panel  Patient thinks he had EGD within the past 6 months at the [facility]  We will have him get these records for review  If this was not done would recommend repeat EGD at time of endoscopic ultrasound  - Celiac Disease Antibody Profile; Future    2  Anemia, unspecified type: hx of anemia while on prostate cancer therapy  Last cbc with Hgb 10 7  Will repeat at this time  - CBC and differential    3  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  He was seen in our office 03/2018 and recommended to have an endoscopic ultrasound for further evaluation  He deferred endoscopic ultrasound at that time  He also had an EGD in January of 2018 that did show some duodenitis and it was recommended to further rule out celiac disease  He states that he had an EGD or endoscopic ultrasound within the past 6 months at the [facility]  We do not have these records to review at this time  He states that he will get these records and provide our office with that  We will make recommendations regarding EGD or endoscopic ultrasound after reviewing records    4  Constipation:  He does admit to new onset constipation  He uses Dulcolax over-the-counter, which works for him  I have suggested MiraLax 17 grams daily  He states that he thinks his last colonoscopy was greater than 10 years ago  There is no record of this within our system  I offered scheduling colonoscopy for him today to rule out large polyp her underlying malignancy    He would prefer to wait until we receive the [place] records and perform EGD, EUS at same time of colonoscopy  -start miralax 17g daily  -recommend colonoscopy to rule out large polyp or underlying malignancy    ______________________________________________________________________    SUBJECTIVE:  50-year-old male past medical history of prostate cancer who has most of his medical care at the [address] [place] [address] in [facility] here for follow-up  He was referred from the [address] [place] [address] but is unsure what this appointment is 4  He has been seen by our office in the past for MRI and CT of the abdomen and pelvis that revealed is submucosal lesion located in the greater curvature of the stomach  This imaging was from 2017 and at that time he was found to be 2 5 centimeters length thought to be a GIST  He did have an EGD in January 2018 in the PET scan for further evaluation  PET scan was negative as well as EGD  He was recommend to have an endoscopic ultrasound for further evaluation by our office but he deferred at that time  He states that he thinks he had an EGD or endoscopic ultrasound within the past 6 months at the [address] [place] [address]  We do not have any records  He states that he will get this records and given to us so we can review and plan for EUS if necessary he thinks his last colonoscopy was greater than 10 years ago  He is complaining of new onset constipation and uses Dulcolax for  REVIEW OF SYSTEMS IS OTHERWISE NEGATIVE  Historical Information   Past Medical History:   Diagnosis Date    Cardiac disease     needs aortic valve    Prostate disease      Past Surgical History:   Procedure Laterality Date    BRAIN SURGERY      tumor removal 15 yrs ago     Social History   Social History     Substance and Sexual Activity   Alcohol Use No     Social History     Substance and Sexual Activity   Drug Use No     Social History     Tobacco Use   Smoking Status Current Every Day Smoker   Smokeless Tobacco Never Used     History reviewed  No pertinent family history      Meds/Allergies       Current Outpatient Medications:     ARIPiprazole (ABILIFY) 5 mg tablet    enalapril (VASOTEC) 10 mg tablet    No Known Allergies        Objective     Blood pressure (!) 185/70, pulse (!) 54, temperature (!) 97 4 °F (36 3 °C), temperature source Tympanic, height 5' 7" (1 702 m), weight 93 2 kg (205 lb 6 4 oz)  Body mass index is 32 17 kg/m²  PHYSICAL EXAM:      General Appearance:   Alert, cooperative, no distress   HEENT:   Normocephalic, atraumatic, anicteric  Right eye exhibits no discharge  Left eye exhibits no discharge  No scleral icterus    Neck:  Supple, symmetrical, trachea midline, no stridor    Lungs:   Clear to auscultation bilaterally; no rales, rhonchi or wheezing; respirations unlabored    Heart[de-identified]   Regular rate and rhythm; no murmur, rub, or gallop  Abdomen:   Soft, non-tender, non-distended; normal bowel sounds; no masses, no organomegaly    Genitalia:   Deferred    Rectal:   Deferred    Extremities:  No cyanosis, clubbing or edema        Skin:  No jaundice, rashes, or lesions          Lab Results:   No visits with results within 1 Day(s) from this visit     Latest known visit with results is:   Admission on 02/05/2019, Discharged on 02/05/2019   Component Date Value    WBC 02/05/2019 8 15     RBC 02/05/2019 3 99     Hemoglobin 02/05/2019 10 7*    Hematocrit 02/05/2019 35 5*    MCV 02/05/2019 89     MCH 02/05/2019 26 8     MCHC 02/05/2019 30 1*    RDW 02/05/2019 15 2*    MPV 02/05/2019 8 9     Platelets 62/22/1367 317     nRBC 02/05/2019 0     Neutrophils Relative 02/05/2019 68     Immat GRANS % 02/05/2019 0     Lymphocytes Relative 02/05/2019 17     Monocytes Relative 02/05/2019 8     Eosinophils Relative 02/05/2019 6     Basophils Relative 02/05/2019 1     Neutrophils Absolute 02/05/2019 5 53     Immature Grans Absolute 02/05/2019 0 03     Lymphocytes Absolute 02/05/2019 1 40     Monocytes Absolute 02/05/2019 0 67     Eosinophils Absolute 02/05/2019 0 45     Basophils Absolute 02/05/2019 0 07     Protime 02/05/2019 13 1     INR 02/05/2019 1 04     PTT 02/05/2019 29     Sodium 02/05/2019 143     Potassium 02/05/2019 3 7     Chloride 02/05/2019 105     CO2 02/05/2019 32     ANION GAP 02/05/2019 6     BUN 02/05/2019 21     Creatinine 02/05/2019 1 18     Glucose 02/05/2019 228*    Calcium 02/05/2019 8 9     AST 02/05/2019 26     ALT 02/05/2019 46     Alkaline Phosphatase 02/05/2019 80     Total Protein 02/05/2019 6 6     Albumin 02/05/2019 3 5     Total Bilirubin 02/05/2019 0 50     eGFR 02/05/2019 59     LACTIC ACID 02/05/2019 1 0     NT-proBNP 02/05/2019 442     Troponin I 02/05/2019 0 23*    Ventricular Rate 02/05/2019 74     Atrial Rate 02/05/2019 74     ME Interval 02/05/2019 138     QRSD Interval 02/05/2019 108     QT Interval 02/05/2019 426     QTC Interval 02/05/2019 472     P Axis 02/05/2019 66     QRS Axis 02/05/2019 55     T Wave Axis 02/05/2019 72          Radiology Results:   No results found

## 2019-11-19 ENCOUNTER — TELEPHONE (OUTPATIENT)
Dept: GASTROENTEROLOGY | Facility: CLINIC | Age: 79
End: 2019-11-19

## 2019-11-19 NOTE — TELEPHONE ENCOUNTER
As per Marcela Fernandez request, previous records are needed  Message Bonnie Melendez from [place] to obtain  Per Yamilka Hoover, patient must be scheduled with a doctor

## 2020-01-29 ENCOUNTER — TELEPHONE (OUTPATIENT)
Dept: GASTROENTEROLOGY | Facility: CLINIC | Age: 80
End: 2020-01-29

## 2020-01-29 ENCOUNTER — OFFICE VISIT (OUTPATIENT)
Dept: GASTROENTEROLOGY | Facility: CLINIC | Age: 80
End: 2020-01-29
Payer: COMMERCIAL

## 2020-01-29 VITALS
DIASTOLIC BLOOD PRESSURE: 80 MMHG | HEIGHT: 67 IN | HEART RATE: 80 BPM | SYSTOLIC BLOOD PRESSURE: 157 MMHG | BODY MASS INDEX: 33.3 KG/M2 | WEIGHT: 212.2 LBS | TEMPERATURE: 96 F

## 2020-01-29 DIAGNOSIS — D64.9 ANEMIA, UNSPECIFIED TYPE: ICD-10-CM

## 2020-01-29 DIAGNOSIS — C49.A4 GIST (GASTROINTESTINAL STROMA TUMOR), MALIGNANT, COLON (HCC): Primary | ICD-10-CM

## 2020-01-29 DIAGNOSIS — I35.0 AORTIC VALVE STENOSIS, ETIOLOGY OF CARDIAC VALVE DISEASE UNSPECIFIED: ICD-10-CM

## 2020-01-29 DIAGNOSIS — K29.80 DUODENITIS: ICD-10-CM

## 2020-01-29 PROCEDURE — 99215 OFFICE O/P EST HI 40 MIN: CPT | Performed by: INTERNAL MEDICINE

## 2020-01-29 NOTE — PATIENT INSTRUCTIONS
-- We will set you up for an endoscopic ultrasound of the stomach to evaluate for the mass that was seen on prior imaging    -- We will also set you up for a colonoscopy as you are due  -- Please speak with your cardiologist to make sure he or she is okay with us doing the procedures from a cardiac standpoint  Please also clarify if you are on blood thinners  -- Try to avoid NSAIDs (such as advil, ibuprofen, Alleve, etc )     -- Please take 1 capful or Miralax with 10 ounces of water every evening to help treat the constipation        They will call you  To schedule you test

## 2020-01-29 NOTE — TELEPHONE ENCOUNTER
Attempted to reach [place] from [place] via [facility] 1/27 for updated referral, no response  Contacted her via phone 1/29 [phone]  She stated patients referral ran our 1/1/2020 and must be renewed by his [place] PCP  She will contact them, but she is not sure if they will put one through immediately for him, if not, todays visit will have to be put through his current insurance  All other services are to be paid for by [facility]

## 2020-01-29 NOTE — PROGRESS NOTES
[facility] - Outpatient Follow-up Note  Earnest Bailon 67 y o  male MRN: [id number]  Encounter: [id number]          ASSESSMENT AND PLAN:    Earnest Bailon is a 67 y o  male who presents with complaint of unexplained anemia and possible GIST seen on prior imaging  EGD 2 years ago without intraluminal pathology in the stomach  He has never had an EUS  His last colonoscopy was also > 10 years ago  He notes some aortic valve calcifications but states the pump of the heart is good  He has a follow-up with his cardiologist coming up in February  Available labs and imaging reviewed  1  GIST (gastrointestinal stroma tumor), malignant, colon ([facility] [address] )    2  Duodenitis    3  Anemia, unspecified type    4  Aortic valve stenosis, etiology of cardiac valve disease unspecified        Orders Placed This Encounter   Procedures    Colonoscopy    Endoscopic ultrasonography, GI (Upper)     -- We will set you up for an endoscopic ultrasound of the stomach with repeat EGD to evaluate for the mass that was seen on prior imaging, and given the anemia   -- We will also set you up for a colonoscopy as you are due and given the anemia  -- Consider VCE in the future depending on the results of the above  -- Please speak with your cardiologist to make sure he or she is okay with us doing the procedures from a cardiac standpoint  Please also clarify if you are on blood thinners  -- Try to avoid NSAIDs (such as advil, ibuprofen, Alleve, etc )  -- Continue iron supplements for now  -- Please take 1 capful or Miralax with 10 ounces of water every evening to help treat the constipation  ______________________________________________________________________    SUBJECTIVE:    Earnest Bailon is a 67 y o  male who presents with complaint of follow-up for possible gastric GIST  He has prostate cancer and he is on medication for it  HE believes that the medication causes nausea   He gets it every day, and it occur immediately after he takes the medication  It can last for 4 hours, and it is better after he eats for lunch  No vomiting  He is able to eat and keep up his appetite  No heartburn, dysphagia, odynophagia, abdominal pain  He has alternating diarrhea and constipation (3-4 days of constipation without a BM followed by 1-2 days of diarrhea)  He tried dulcolax without much help  He has not tried Miralax  No BRBPR  His stools are black but he takes iron supplements  He notes 6 months ago he was found to be very anemic on blood work  He was admitted to the [place] and given blood  He received iron infusions and that was followed by iron pills  His anemia resolved on repeat blood work  Last colonoscopy was > 10 years ago  No weight loss  REVIEW OF SYSTEMS IS OTHERWISE NEGATIVE  10 point ROS reviewed and negative, except as above      Historical Information   Past Medical History:   Diagnosis Date    Cardiac disease     needs aortic valve    Prostate disease      Past Surgical History:   Procedure Laterality Date    BRAIN SURGERY      tumor removal 15 yrs ago     Social History   Social History     Substance and Sexual Activity   Alcohol Use No     Social History     Substance and Sexual Activity   Drug Use No     Social History     Tobacco Use   Smoking Status Current Every Day Smoker   Smokeless Tobacco Never Used     History reviewed  No pertinent family history  Meds/Allergies       Current Outpatient Medications:     ARIPiprazole (ABILIFY) 5 mg tablet    bisacodyl (DULCOLAX) 5 mg EC tablet    enalapril (VASOTEC) 10 mg tablet    polyethylene glycol (GOLYTELY) 4000 mL solution    No Known Allergies        Objective     Blood pressure 157/80, pulse 80, temperature (!) 96 °F (35 6 °C), temperature source Tympanic, height 5' 7" (1 702 m), weight 96 3 kg (212 lb 3 2 oz)  Body mass index is 33 24 kg/m²        PHYSICAL EXAM:      General Appearance:   Alert, cooperative, no distress   HEENT:   Normocephalic, atraumatic, anicteric  Neck:  Supple, symmetrical, trachea midline   Lungs:   Clear to auscultation bilaterally; no rales, rhonchi or wheezing; respirations unlabored    Heart[de-identified]   Regular rate and rhythm; no murmur, rub, or gallop  Abdomen:   Soft, non-tender, non-distended; normal bowel sounds; no masses, no organomegaly    Genitalia:   Deferred    Rectal:   Deferred    Extremities:  No cyanosis, clubbing or edema    Pulses:  2+ and symmetric    Skin:  No jaundice, rashes, or lesions    Lymph nodes:  No palpable cervical lymphadenopathy        Lab Results:   No visits with results within 1 Day(s) from this visit     Latest known visit with results is:   Admission on 02/05/2019, Discharged on 02/05/2019   Component Date Value    WBC 02/05/2019 8 15     RBC 02/05/2019 3 99     Hemoglobin 02/05/2019 10 7*    Hematocrit 02/05/2019 35 5*    MCV 02/05/2019 89     MCH 02/05/2019 26 8     MCHC 02/05/2019 30 1*    RDW 02/05/2019 15 2*    MPV 02/05/2019 8 9     Platelets 66/60/8035 317     nRBC 02/05/2019 0     Neutrophils Relative 02/05/2019 68     Immat GRANS % 02/05/2019 0     Lymphocytes Relative 02/05/2019 17     Monocytes Relative 02/05/2019 8     Eosinophils Relative 02/05/2019 6     Basophils Relative 02/05/2019 1     Neutrophils Absolute 02/05/2019 5 53     Immature Grans Absolute 02/05/2019 0 03     Lymphocytes Absolute 02/05/2019 1 40     Monocytes Absolute 02/05/2019 0 67     Eosinophils Absolute 02/05/2019 0 45     Basophils Absolute 02/05/2019 0 07     Protime 02/05/2019 13 1     INR 02/05/2019 1 04     PTT 02/05/2019 29     Sodium 02/05/2019 143     Potassium 02/05/2019 3 7     Chloride 02/05/2019 105     CO2 02/05/2019 32     ANION GAP 02/05/2019 6     BUN 02/05/2019 21     Creatinine 02/05/2019 1 18     Glucose 02/05/2019 228*    Calcium 02/05/2019 8 9     AST 02/05/2019 26     ALT 02/05/2019 46     Alkaline Phosphatase 02/05/2019 80     Total Protein 02/05/2019 6  6     Albumin 02/05/2019 3 5     Total Bilirubin 02/05/2019 0 50     eGFR 02/05/2019 59     LACTIC ACID 02/05/2019 1 0     NT-proBNP 02/05/2019 442     Troponin I 02/05/2019 0 23*    Ventricular Rate 02/05/2019 74     Atrial Rate 02/05/2019 74     AL Interval 02/05/2019 138     QRSD Interval 02/05/2019 108     QT Interval 02/05/2019 426     QTC Interval 02/05/2019 472     P Axis 02/05/2019 66     QRS Axis 02/05/2019 55     T Wave Axis 02/05/2019 72        Lab Results   Component Value Date    WBC 8 15 02/05/2019    HGB 10 7 (L) 02/05/2019    HCT 35 5 (L) 02/05/2019    MCV 89 02/05/2019     02/05/2019       Lab Results   Component Value Date     04/06/2014    SODIUM 143 02/05/2019    K 3 7 02/05/2019     02/05/2019    CO2 32 02/05/2019    ANIONGAP 13 04/06/2014    AGAP 6 02/05/2019    BUN 21 02/05/2019    CREATININE 1 18 02/05/2019    GLUC 228 (H) 02/05/2019    CALCIUM 8 9 02/05/2019    AST 26 02/05/2019    ALT 46 02/05/2019    ALKPHOS 80 02/05/2019    PROT 6 2 04/06/2014    TP 6 6 02/05/2019    BILITOT 0 8 04/06/2014    TBILI 0 50 02/05/2019    EGFR 59 02/05/2019       No results found for: CRP    Lab Results   Component Value Date    IJL4NEBHOEVK 1 517 04/06/2014       No results found for: IRON, TIBC, FERRITIN    Radiology Results:   No results found

## 2020-02-25 ENCOUNTER — TELEPHONE (OUTPATIENT)
Dept: GASTROENTEROLOGY | Facility: CLINIC | Age: 80
End: 2020-02-25

## 2020-02-25 NOTE — TELEPHONE ENCOUNTER
Patient also inquired about Endoscopic Ultrasound order in [facility], contacted Joselyn Cole procedure  in [place]  She will be contacting patient to schedule  Patient came into office with bill received from office visit 1/29/20  As per patient, all GI bills should go to [facility]  As per note in chart, referral was not received in time of appointment  Referral was received after visit and backdated, visit was covered  As per Linde Peabody in billing, account was fixed and bill sent to [facility]

## 2020-10-23 ENCOUNTER — HOSPITAL ENCOUNTER (EMERGENCY)
Facility: HOSPITAL | Age: 80
Discharge: HOME | End: 2020-10-23
Attending: EMERGENCY MEDICINE | Admitting: EMERGENCY MEDICINE
Payer: COMMERCIAL

## 2020-10-23 ENCOUNTER — APPOINTMENT (EMERGENCY)
Dept: CT IMAGING | Facility: HOSPITAL | Age: 80
End: 2020-10-23
Payer: COMMERCIAL

## 2020-10-23 VITALS
BODY MASS INDEX: 32.42 KG/M2 | DIASTOLIC BLOOD PRESSURE: 60 MMHG | OXYGEN SATURATION: 95 % | SYSTOLIC BLOOD PRESSURE: 130 MMHG | RESPIRATION RATE: 18 BRPM | WEIGHT: 207.01 LBS | HEART RATE: 76 BPM | TEMPERATURE: 98.7 F

## 2020-10-23 DIAGNOSIS — D72.829 LEUKOCYTOSIS: Primary | ICD-10-CM

## 2020-10-23 DIAGNOSIS — K52.9 COLITIS: ICD-10-CM

## 2020-10-23 DIAGNOSIS — E27.8 ADRENAL NODULE (HCC): ICD-10-CM

## 2020-10-23 LAB
ABO GROUP BLD: NORMAL
ALBUMIN SERPL BCP-MCNC: 3.5 G/DL (ref 3.5–5)
ALP SERPL-CCNC: 107 U/L (ref 46–116)
ALT SERPL W P-5'-P-CCNC: 33 U/L (ref 12–78)
ANION GAP SERPL CALCULATED.3IONS-SCNC: 7 MMOL/L (ref 4–13)
APTT PPP: 27 SECONDS (ref 23–37)
AST SERPL W P-5'-P-CCNC: 33 U/L (ref 5–45)
BASOPHILS # BLD AUTO: 0.05 THOUSANDS/ΜL (ref 0–0.1)
BASOPHILS NFR BLD AUTO: 0 % (ref 0–1)
BILIRUB SERPL-MCNC: 0.9 MG/DL (ref 0.2–1)
BLD GP AB SCN SERPL QL: NEGATIVE
BUN SERPL-MCNC: 28 MG/DL (ref 5–25)
CALCIUM SERPL-MCNC: 9.3 MG/DL (ref 8.3–10.1)
CHLORIDE SERPL-SCNC: 104 MMOL/L (ref 100–108)
CO2 SERPL-SCNC: 28 MMOL/L (ref 21–32)
CREAT SERPL-MCNC: 1.28 MG/DL (ref 0.6–1.3)
EOSINOPHIL # BLD AUTO: 0.16 THOUSAND/ΜL (ref 0–0.61)
EOSINOPHIL NFR BLD AUTO: 1 % (ref 0–6)
ERYTHROCYTE [DISTWIDTH] IN BLOOD BY AUTOMATED COUNT: 14.5 % (ref 11.6–15.1)
GFR SERPL CREATININE-BSD FRML MDRD: 53 ML/MIN/1.73SQ M
GLUCOSE SERPL-MCNC: 120 MG/DL (ref 65–140)
HCT VFR BLD AUTO: 45.6 % (ref 36.5–49.3)
HGB BLD-MCNC: 14.9 G/DL (ref 12–17)
IMM GRANULOCYTES # BLD AUTO: 0.08 THOUSAND/UL (ref 0–0.2)
IMM GRANULOCYTES NFR BLD AUTO: 1 % (ref 0–2)
INR PPP: 1.03 (ref 0.84–1.19)
LIPASE SERPL-CCNC: 96 U/L (ref 73–393)
LYMPHOCYTES # BLD AUTO: 1 THOUSANDS/ΜL (ref 0.6–4.47)
LYMPHOCYTES NFR BLD AUTO: 7 % (ref 14–44)
MCH RBC QN AUTO: 31.1 PG (ref 26.8–34.3)
MCHC RBC AUTO-ENTMCNC: 32.7 G/DL (ref 31.4–37.4)
MCV RBC AUTO: 95 FL (ref 82–98)
MONOCYTES # BLD AUTO: 1.3 THOUSAND/ΜL (ref 0.17–1.22)
MONOCYTES NFR BLD AUTO: 9 % (ref 4–12)
NEUTROPHILS # BLD AUTO: 11.57 THOUSANDS/ΜL (ref 1.85–7.62)
NEUTS SEG NFR BLD AUTO: 82 % (ref 43–75)
NRBC BLD AUTO-RTO: 0 /100 WBCS
PLATELET # BLD AUTO: 282 THOUSANDS/UL (ref 149–390)
PMV BLD AUTO: 8.8 FL (ref 8.9–12.7)
POTASSIUM SERPL-SCNC: 3.7 MMOL/L (ref 3.5–5.3)
PROT SERPL-MCNC: 6.7 G/DL (ref 6.4–8.2)
PROTHROMBIN TIME: 13.3 SECONDS (ref 11.6–14.5)
RBC # BLD AUTO: 4.79 MILLION/UL (ref 3.88–5.62)
RH BLD: POSITIVE
SODIUM SERPL-SCNC: 139 MMOL/L (ref 136–145)
SPECIMEN EXPIRATION DATE: NORMAL
WBC # BLD AUTO: 14.16 THOUSAND/UL (ref 4.31–10.16)

## 2020-10-23 PROCEDURE — 96367 TX/PROPH/DG ADDL SEQ IV INF: CPT

## 2020-10-23 PROCEDURE — 85730 THROMBOPLASTIN TIME PARTIAL: CPT | Performed by: PHYSICIAN ASSISTANT

## 2020-10-23 PROCEDURE — 99285 EMERGENCY DEPT VISIT HI MDM: CPT

## 2020-10-23 PROCEDURE — 80053 COMPREHEN METABOLIC PANEL: CPT | Performed by: PHYSICIAN ASSISTANT

## 2020-10-23 PROCEDURE — 36415 COLL VENOUS BLD VENIPUNCTURE: CPT | Performed by: PHYSICIAN ASSISTANT

## 2020-10-23 PROCEDURE — 86850 RBC ANTIBODY SCREEN: CPT | Performed by: PHYSICIAN ASSISTANT

## 2020-10-23 PROCEDURE — 74177 CT ABD & PELVIS W/CONTRAST: CPT

## 2020-10-23 PROCEDURE — 85025 COMPLETE CBC W/AUTO DIFF WBC: CPT | Performed by: PHYSICIAN ASSISTANT

## 2020-10-23 PROCEDURE — 99284 EMERGENCY DEPT VISIT MOD MDM: CPT | Performed by: PHYSICIAN ASSISTANT

## 2020-10-23 PROCEDURE — 85610 PROTHROMBIN TIME: CPT | Performed by: PHYSICIAN ASSISTANT

## 2020-10-23 PROCEDURE — 83690 ASSAY OF LIPASE: CPT | Performed by: PHYSICIAN ASSISTANT

## 2020-10-23 PROCEDURE — G1004 CDSM NDSC: HCPCS

## 2020-10-23 PROCEDURE — 86901 BLOOD TYPING SEROLOGIC RH(D): CPT | Performed by: PHYSICIAN ASSISTANT

## 2020-10-23 PROCEDURE — 82272 OCCULT BLD FECES 1-3 TESTS: CPT

## 2020-10-23 PROCEDURE — 96365 THER/PROPH/DIAG IV INF INIT: CPT

## 2020-10-23 PROCEDURE — 86900 BLOOD TYPING SEROLOGIC ABO: CPT | Performed by: PHYSICIAN ASSISTANT

## 2020-10-23 RX ORDER — CIPROFLOXACIN 500 MG/1
500 TABLET, FILM COATED ORAL EVERY 12 HOURS
Qty: 14 TABLET | Refills: 0 | Status: SHIPPED | OUTPATIENT
Start: 2020-10-23 | End: 2020-10-30

## 2020-10-23 RX ORDER — METRONIDAZOLE 500 MG/1
500 TABLET ORAL EVERY 8 HOURS SCHEDULED
Qty: 21 TABLET | Refills: 0 | Status: SHIPPED | OUTPATIENT
Start: 2020-10-23 | End: 2020-10-30

## 2020-10-23 RX ORDER — CIPROFLOXACIN 2 MG/ML
400 INJECTION, SOLUTION INTRAVENOUS ONCE
Status: COMPLETED | OUTPATIENT
Start: 2020-10-23 | End: 2020-10-23

## 2020-10-23 RX ADMIN — CIPROFLOXACIN 400 MG: 2 INJECTION, SOLUTION INTRAVENOUS at 17:44

## 2020-10-23 RX ADMIN — METRONIDAZOLE 500 MG: 500 INJECTION, SOLUTION INTRAVENOUS at 17:00

## 2020-10-23 RX ADMIN — IOHEXOL 100 ML: 350 INJECTION, SOLUTION INTRAVENOUS at 15:27

## 2022-04-22 ENCOUNTER — TELEPHONE (OUTPATIENT)
Dept: GASTROENTEROLOGY | Facility: CLINIC | Age: 82
End: 2022-04-22

## 2022-04-22 NOTE — TELEPHONE ENCOUNTER
Called patient  Left message to contact our office to arrange appointment as per [facility] referral     DX  SABINE exophytic large lesion extrinsic to stomach, thickening of rectum on imaging

## 2022-05-04 ENCOUNTER — TELEPHONE (OUTPATIENT)
Dept: SURGERY | Facility: CLINIC | Age: 82
End: 2022-05-04

## 2022-05-04 ENCOUNTER — OFFICE VISIT (OUTPATIENT)
Dept: GASTROENTEROLOGY | Facility: CLINIC | Age: 82
End: 2022-05-04
Payer: COMMERCIAL

## 2022-05-04 VITALS
SYSTOLIC BLOOD PRESSURE: 142 MMHG | DIASTOLIC BLOOD PRESSURE: 76 MMHG | HEIGHT: 67 IN | BODY MASS INDEX: 30.92 KG/M2 | HEART RATE: 71 BPM | WEIGHT: 197 LBS | TEMPERATURE: 98.8 F

## 2022-05-04 DIAGNOSIS — D21.4 BENIGN GASTROINTESTINAL STROMAL TUMOR (GIST): Primary | ICD-10-CM

## 2022-05-04 DIAGNOSIS — R93.3 ABNORMAL CT SCAN, COLON: ICD-10-CM

## 2022-05-04 DIAGNOSIS — D50.9 IRON DEFICIENCY ANEMIA, UNSPECIFIED IRON DEFICIENCY ANEMIA TYPE: ICD-10-CM

## 2022-05-04 PROBLEM — C49.A4 GIST (GASTROINTESTINAL STROMA TUMOR), MALIGNANT, COLON (HCC): Status: RESOLVED | Noted: 2018-03-16 | Resolved: 2022-05-04

## 2022-05-04 PROCEDURE — 99214 OFFICE O/P EST MOD 30 MIN: CPT | Performed by: INTERNAL MEDICINE

## 2022-05-04 RX ORDER — FERROUS SULFATE 325(65) MG
1 TABLET ORAL 2 TIMES DAILY WITH MEALS
COMMUNITY
Start: 2021-10-28 | End: 2022-10-28

## 2022-05-04 RX ORDER — FLUOXETINE 20 MG/1
20 TABLET ORAL DAILY
COMMUNITY

## 2022-05-04 RX ORDER — LISINOPRIL 10 MG/1
10 TABLET ORAL DAILY
COMMUNITY
Start: 2020-05-13

## 2022-05-04 RX ORDER — ATORVASTATIN CALCIUM 40 MG/1
40 TABLET, FILM COATED ORAL DAILY
COMMUNITY

## 2022-05-04 RX ORDER — AMLODIPINE BESYLATE 10 MG/1
10 TABLET ORAL DAILY
COMMUNITY
Start: 2020-05-13

## 2022-05-04 RX ORDER — ASPIRIN 81 MG/1
81 TABLET, CHEWABLE ORAL DAILY
COMMUNITY
Start: 2021-11-12 | End: 2022-11-12

## 2022-05-04 NOTE — PATIENT INSTRUCTIONS
Per Dr Dilia Li pt needs to be set for EUS and Colonoscopy at [place] message sent to surgery bin, Miralax and Mag Citrate instructions given and explained, follow up appt made

## 2022-05-04 NOTE — TELEPHONE ENCOUNTER
Per Dr Gillespie Herkimer pt needs to be set for EUS and Colonoscopy at [facility] and Mag Citrate instructions given

## 2022-05-04 NOTE — PROGRESS NOTES
[facility] - Outpatient Consultation  Rhea Correa [de-identified] y o  male MRN: [id number]  Encounter: [id number]          ASSESSMENT AND PLAN:      1  Benign gastrointestinal stromal tumor (GIST)  2  Abnormal CT scan, colon  3  Iron deficiency anemia, unspecified iron deficiency anemia type  He has iron deficiency anemia which could be from chronic bleeding and his upper GI tract or his colon especially given his history of gastrointestinal stromal tumor which could be ulcerated and his abnormal CT scan of his colon which could indicate colitis  I will schedule him for colonoscopy and upper endoscopy with endoscopic ultrasound for further evaluation  He can continue to take his iron supplements  - Colonoscopy; Future  - Endoscopic ultrasonography, GI (Upper) Linear with FNA; Future    ______________________________________________________________________    HPI:  He presents for evaluation because of a history of gastrointestinal stromal tumor in his stomach  He does not know the size but said he had heard it was stable on his previous surveillance but he believes it was a few years ago  He has not been having any reflux, nausea, vomiting, abdominal pain, change in bowel habits, bleeding, or weight loss  His last CT scan in 2020 revealed possible colitis  Plan was for an endoscopic ultrasound and colonoscopy at that time especially since he also has iron deficiency anemia but he has not yet had those completed  He has been taking iron supplements  He had heart valve replacement and is feeling much better since that was completed  REVIEW OF SYSTEMS:    CONSTITUTIONAL: Denies any fever, chills, rigors, and weight loss  HEENT: No earache or tinnitus  Denies hearing loss or visual disturbances  CARDIOVASCULAR: No chest pain or palpitations  RESPIRATORY: Denies any cough, hemoptysis, shortness of breath or dyspnea on exertion    GASTROINTESTINAL: As noted in the History of Present Illness  GENITOURINARY: No problems with urination  Denies any hematuria or dysuria  NEUROLOGIC: No dizziness or vertigo, denies headaches  MUSCULOSKELETAL: Denies any muscle or joint pain  SKIN: Denies skin rashes or itching  ENDOCRINE: Denies excessive thirst  Denies intolerance to heat or cold  PSYCHOSOCIAL: Denies depression or anxiety  Denies any recent memory loss  Historical Information   Past Medical History:   Diagnosis Date    Cardiac disease     needs aortic valve    Prostate disease      Past Surgical History:   Procedure Laterality Date    BRAIN SURGERY      tumor removal 15 yrs ago     Social History   Social History     Substance and Sexual Activity   Alcohol Use No     Social History     Substance and Sexual Activity   Drug Use No     Social History     Tobacco Use   Smoking Status Current Every Day Smoker   Smokeless Tobacco Never Used     No family history on file  Meds/Allergies       Current Outpatient Medications:     amLODIPine (NORVASC) 10 mg tablet    aspirin 81 mg chewable tablet    ferrous sulfate 325 (65 Fe) mg tablet    lisinopril (ZESTRIL) 10 mg tablet    ARIPiprazole (ABILIFY) 5 mg tablet    atorvastatin (LIPITOR) 40 mg tablet    bisacodyl (DULCOLAX) 5 mg EC tablet    enalapril (VASOTEC) 10 mg tablet    Fluoxetine HCl, PMDD, 20 MG TABS    polyethylene glycol (GOLYTELY) 4000 mL solution    No Known Allergies        Objective     Blood pressure 142/76, pulse 71, temperature 98 8 °F (37 1 °C), height 5' 7" (1 702 m), weight 89 4 kg (197 lb)  Body mass index is 30 85 kg/m²  PHYSICAL EXAM:      General Appearance:   Alert, cooperative, no distress   HEENT:   Normocephalic, atraumatic, anicteric      Neck:  Supple, symmetrical, trachea midline   Lungs:   Clear to auscultation bilaterally; no rales, rhonchi or wheezing; respirations unlabored    Heart[de-identified]   Regular rate and rhythm; no murmur, rub, or gallop     Abdomen:   Soft, non-tender, non-distended; normal bowel sounds; no masses, no organomegaly    Genitalia:   Deferred    Rectal:   Deferred    Extremities:  No cyanosis, clubbing or edema    Pulses:  2+ and symmetric    Skin:  No jaundice, rashes, or lesions    Lymph nodes:  No palpable cervical lymphadenopathy        Lab Results:   No visits with results within 1 Day(s) from this visit     Latest known visit with results is:   Admission on 10/23/2020, Discharged on 10/23/2020   Component Date Value    ABO Grouping 10/23/2020 O     Rh Factor 10/23/2020 Positive     Antibody Screen 10/23/2020 Negative     Specimen Expiration Date 10/23/2020 [id number]     Sodium 10/23/2020 139     Potassium 10/23/2020 3 7     Chloride 10/23/2020 104     CO2 10/23/2020 28     ANION GAP 10/23/2020 7     BUN 10/23/2020 28*    Creatinine 10/23/2020 1 28     Glucose 10/23/2020 120     Calcium 10/23/2020 9 3     AST 10/23/2020 33     ALT 10/23/2020 33     Alkaline Phosphatase 10/23/2020 107     Total Protein 10/23/2020 6 7     Albumin 10/23/2020 3 5     Total Bilirubin 10/23/2020 0 90     eGFR 10/23/2020 53     WBC 10/23/2020 14 16*    RBC 10/23/2020 4 79     Hemoglobin 10/23/2020 14 9     Hematocrit 10/23/2020 45 6     MCV 10/23/2020 95     MCH 10/23/2020 31 1     MCHC 10/23/2020 32 7     RDW 10/23/2020 14 5     MPV 10/23/2020 8 8*    Platelets 10/94/2193 282     nRBC 10/23/2020 0     Neutrophils Relative 10/23/2020 82*    Immat GRANS % 10/23/2020 1     Lymphocytes Relative 10/23/2020 7*    Monocytes Relative 10/23/2020 9     Eosinophils Relative 10/23/2020 1     Basophils Relative 10/23/2020 0     Neutrophils Absolute 10/23/2020 11 57*    Immature Grans Absolute 10/23/2020 0 08     Lymphocytes Absolute 10/23/2020 1 00     Monocytes Absolute 10/23/2020 1 30*    Eosinophils Absolute 10/23/2020 0 16     Basophils Absolute 10/23/2020 0 05     Protime 10/23/2020 13 3     INR 10/23/2020 1 03     PTT 10/23/2020 27     Lipase 10/23/2020 96 Radiology Results:   No results found

## 2022-05-20 ENCOUNTER — TELEPHONE (OUTPATIENT)
Dept: GASTROENTEROLOGY | Facility: CLINIC | Age: 82
End: 2022-05-20

## 2022-05-20 NOTE — TELEPHONE ENCOUNTER
Per Dr Yoan Andrade pt needs to be set for EUS and Colonoscopy at [facility] and Mag Citrate instructions given

## 2022-05-25 NOTE — TELEPHONE ENCOUNTER
Paul Batista ok'd to schedule in [facility] at 11:15am     Scheduled date of EUS/colonoscopy (as of today): 6/24/2022  Physician performing EUS/colonoscopy: Dr Goodwin  Location of EUS/colonoscopy: [facility]  Desired bowel prep reviewed with patient: Miralax/Mag Citrate  Instructions reviewed with patient by: Mildred Keys in [facility]  Clearances:  N/A

## 2022-06-21 ENCOUNTER — TELEPHONE (OUTPATIENT)
Dept: GASTROENTEROLOGY | Facility: CLINIC | Age: 82
End: 2022-06-21

## 2022-06-21 NOTE — TELEPHONE ENCOUNTER
Patient came into office with questions regarding his upcomming procedure with Dr Gregg Rainey  He stated he did not receive his procedure packet  I did verify he received a white folder from our office but gave him new copy of directions  to make sure he had the correct directions as he was a little confused  Gave him new instructions with Miralax/Magnesium Citrate and reviewed all directions with patient  He expressed understanding  Made sure he was aware procedure was at [facility] and wrote address down on instruction sheet  He will contact our office with any questions

## 2022-06-23 ENCOUNTER — ANESTHESIA EVENT (OUTPATIENT)
Dept: ANESTHESIOLOGY | Facility: HOSPITAL | Age: 82
End: 2022-06-23

## 2022-06-23 ENCOUNTER — ANESTHESIA (OUTPATIENT)
Dept: ANESTHESIOLOGY | Facility: HOSPITAL | Age: 82
End: 2022-06-23

## 2022-06-23 RX ORDER — LIDOCAINE HYDROCHLORIDE 10 MG/ML
0.5 INJECTION, SOLUTION EPIDURAL; INFILTRATION; INTRACAUDAL; PERINEURAL ONCE AS NEEDED
Status: CANCELLED | OUTPATIENT
Start: 2022-06-23

## 2022-06-23 RX ORDER — SODIUM CHLORIDE 9 MG/ML
125 INJECTION, SOLUTION INTRAVENOUS CONTINUOUS
Status: CANCELLED | OUTPATIENT
Start: 2022-06-23

## 2022-06-24 ENCOUNTER — HOSPITAL ENCOUNTER (OUTPATIENT)
Dept: GASTROENTEROLOGY | Facility: HOSPITAL | Age: 82
Setting detail: OUTPATIENT SURGERY
Discharge: HOME | End: 2022-06-24
Attending: INTERNAL MEDICINE | Admitting: INTERNAL MEDICINE
Payer: COMMERCIAL

## 2022-06-24 ENCOUNTER — ANESTHESIA (OUTPATIENT)
Dept: GASTROENTEROLOGY | Facility: HOSPITAL | Age: 82
End: 2022-06-24

## 2022-06-24 ENCOUNTER — ANESTHESIA EVENT (OUTPATIENT)
Dept: GASTROENTEROLOGY | Facility: HOSPITAL | Age: 82
End: 2022-06-24

## 2022-06-24 VITALS
DIASTOLIC BLOOD PRESSURE: 72 MMHG | HEART RATE: 61 BPM | RESPIRATION RATE: 18 BRPM | SYSTOLIC BLOOD PRESSURE: 149 MMHG | OXYGEN SATURATION: 92 % | TEMPERATURE: 96 F

## 2022-06-24 DIAGNOSIS — R93.3 ABNORMAL CT SCAN, COLON: ICD-10-CM

## 2022-06-24 DIAGNOSIS — D21.4 BENIGN GASTROINTESTINAL STROMAL TUMOR (GIST): ICD-10-CM

## 2022-06-24 DIAGNOSIS — D50.9 IRON DEFICIENCY ANEMIA, UNSPECIFIED IRON DEFICIENCY ANEMIA TYPE: ICD-10-CM

## 2022-06-24 PROCEDURE — 88341 IMHCHEM/IMCYTCHM EA ADD ANTB: CPT | Performed by: PATHOLOGY

## 2022-06-24 PROCEDURE — 43242 EGD US FINE NEEDLE BX/ASPIR: CPT | Performed by: INTERNAL MEDICINE

## 2022-06-24 PROCEDURE — 43239 EGD BIOPSY SINGLE/MULTIPLE: CPT | Performed by: INTERNAL MEDICINE

## 2022-06-24 PROCEDURE — 88342 IMHCHEM/IMCYTCHM 1ST ANTB: CPT | Performed by: PATHOLOGY

## 2022-06-24 PROCEDURE — 88305 TISSUE EXAM BY PATHOLOGIST: CPT | Performed by: PATHOLOGY

## 2022-06-24 PROCEDURE — 88112 CYTOPATH CELL ENHANCE TECH: CPT | Performed by: PATHOLOGY

## 2022-06-24 PROCEDURE — 45385 COLONOSCOPY W/LESION REMOVAL: CPT | Performed by: INTERNAL MEDICINE

## 2022-06-24 RX ORDER — PROPOFOL 10 MG/ML
INJECTION, EMULSION INTRAVENOUS AS NEEDED
Status: DISCONTINUED | OUTPATIENT
Start: 2022-06-24 | End: 2022-06-24

## 2022-06-24 RX ORDER — GLYCOPYRROLATE 0.2 MG/ML
INJECTION INTRAMUSCULAR; INTRAVENOUS AS NEEDED
Status: DISCONTINUED | OUTPATIENT
Start: 2022-06-24 | End: 2022-06-24

## 2022-06-24 RX ORDER — PROPOFOL 10 MG/ML
INJECTION, EMULSION INTRAVENOUS CONTINUOUS PRN
Status: DISCONTINUED | OUTPATIENT
Start: 2022-06-24 | End: 2022-06-24

## 2022-06-24 RX ORDER — FENTANYL CITRATE 50 UG/ML
INJECTION, SOLUTION INTRAMUSCULAR; INTRAVENOUS AS NEEDED
Status: DISCONTINUED | OUTPATIENT
Start: 2022-06-24 | End: 2022-06-24

## 2022-06-24 RX ORDER — SODIUM CHLORIDE 9 MG/ML
INJECTION, SOLUTION INTRAVENOUS CONTINUOUS PRN
Status: DISCONTINUED | OUTPATIENT
Start: 2022-06-24 | End: 2022-06-24

## 2022-06-24 RX ADMIN — PROPOFOL 80 MCG/KG/MIN: 10 INJECTION, EMULSION INTRAVENOUS at 09:16

## 2022-06-24 RX ADMIN — PROPOFOL 60 MG: 10 INJECTION, EMULSION INTRAVENOUS at 09:16

## 2022-06-24 RX ADMIN — GLYCOPYRROLATE 0.2 MG: 0.2 INJECTION INTRAMUSCULAR; INTRAVENOUS at 10:26

## 2022-06-24 RX ADMIN — SODIUM CHLORIDE: 0.9 INJECTION, SOLUTION INTRAVENOUS at 10:16

## 2022-06-24 RX ADMIN — SODIUM CHLORIDE: 0.9 INJECTION, SOLUTION INTRAVENOUS at 09:12

## 2022-06-24 RX ADMIN — PROPOFOL 20 MG: 10 INJECTION, EMULSION INTRAVENOUS at 09:33

## 2022-06-24 RX ADMIN — FENTANYL CITRATE 25 MCG: 50 INJECTION INTRAMUSCULAR; INTRAVENOUS at 09:50

## 2022-06-24 RX ADMIN — PROPOFOL 20 MG: 10 INJECTION, EMULSION INTRAVENOUS at 09:25

## 2022-06-24 RX ADMIN — PROPOFOL 20 MG: 10 INJECTION, EMULSION INTRAVENOUS at 09:30

## 2022-06-24 NOTE — ANESTHESIA PREPROCEDURE EVALUATION
Procedure:  COLONOSCOPY  ENDOSCOPIC ULTRASOUND (UPPER)    Relevant Problems   CARDIO   (+) Aortic stenosis      HEMATOLOGY   (+) Iron deficiency anemia   12/21  Normally functioning bioprosthetic aortic valve (#26 mm Begum TAVR   valve)  Concentric left ventricular remodeling with normal LV systolic function  Moderate diastolic dysfunction of the left ventricle with elevated filling   pressure  Mitral annular calcification with no severe inflow stenosis  Left atrial enlargement  No severe pulmonary hypertension  Normal RV size and systolic function  No pericardial disease  Left Ventricle   Left ventricle is normal in size  There is mild concentric remoldeling of the LV  Systolic function is normal with an ejection fraction of 60-65%  Wall motion is within normal limits  There is grade II (moderate) diastolic dysfunction and elevated left atrial pressure  Right Ventricle   Right ventricle cavity is normal  Systolic function is normal          Physical Exam    Airway      TM Distance: >3 FB  Neck ROM: full     Dental   Comment: Chipped and Missing teeth,     Cardiovascular      Pulmonary  Breath sounds clear to auscultation,     Other Findings        Anesthesia Plan  ASA Score- 3     Anesthesia Type- IV sedation with anesthesia with ASA Monitors  Additional Monitors:   Airway Plan:           Plan Factors-Exercise tolerance (METS): >4 METS  Chart reviewed  EKG reviewed  Imaging results reviewed  Patient is a current smoker (1ppd)  Patient not instructed to abstain from smoking on day of procedure  Patient smoked on day of surgery: 3 this am         Induction- intravenous  Postoperative Plan-     Informed Consent- Anesthetic plan and risks discussed with patient  I personally reviewed this patient with the CRNA  Discussed and agreed on the Anesthesia Plan with the CRNA  Jhoan Boss

## 2022-06-24 NOTE — H&P
History and Physical -  [facility]  Juani Silva [de-identified] y o  male MRN: [id number]                  HPI: Juani Silva is a [de-identified]y o  year old male who presents for EGD/EUS/colonoscopy      REVIEW OF SYSTEMS: Per the HPI, and otherwise unremarkable  Historical Information   Past Medical History:   Diagnosis Date    Cardiac disease     needs aortic valve    Prostate disease      Past Surgical History:   Procedure Laterality Date    BRAIN SURGERY      tumor removal 15 yrs ago     Social History   Social History     Substance and Sexual Activity   Alcohol Use No     Social History     Substance and Sexual Activity   Drug Use No     Social History     Tobacco Use   Smoking Status Current Every Day Smoker   Smokeless Tobacco Never Used     No family history on file  Meds/Allergies       Current Outpatient Medications:     amLODIPine (NORVASC) 10 mg tablet    ARIPiprazole (ABILIFY) 5 mg tablet    aspirin 81 mg chewable tablet    atorvastatin (LIPITOR) 40 mg tablet    bisacodyl (DULCOLAX) 5 mg EC tablet    enalapril (VASOTEC) 10 mg tablet    ferrous sulfate 325 (65 Fe) mg tablet    Fluoxetine HCl, PMDD, 20 MG TABS    lisinopril (ZESTRIL) 10 mg tablet    polyethylene glycol (GOLYTELY) 4000 mL solution    No Known Allergies    Objective     /59   Pulse 58   Resp 18   SpO2 98%       PHYSICAL EXAM    Gen: NAD  Head: NCAT  CV: RRR  CHEST: Clear  ABD: soft, NT/ND  EXT: no edema      ASSESSMENT/PLAN:  This is a [de-identified]y o  year old male here for egd/eus/colonoscopy, and he is stable and optimized for his procedure

## 2022-06-24 NOTE — ANESTHESIA POSTPROCEDURE EVALUATION
Post-Op Assessment Note    CV Status:  Stable  Pain Score: 0    Pain management: adequate     Mental Status:  Sleepy and arousable   PONV Controlled:  None   Airway Patency:  Patent      Post Op Vitals Reviewed: Yes      Staff: Anesthesiologist, CRNA         No complications documented      BP   158/60   Temp     Pulse  72   Resp   15   SpO2   95

## 2022-07-15 ENCOUNTER — TELEPHONE (OUTPATIENT)
Dept: GASTROENTEROLOGY | Facility: CLINIC | Age: 82
End: 2022-07-15

## 2022-07-15 DIAGNOSIS — D21.4 BENIGN GASTROINTESTINAL STROMAL TUMOR (GIST): Primary | ICD-10-CM

## 2022-07-15 NOTE — TELEPHONE ENCOUNTER
Called patient notified him of biopsy results revealing GIST  Referral to Surgical Oncology placed       ----- Message from Hal Hope, RN sent at 7/15/2022  7:47 AM EDT -----  Good morning! Dr Penelope Simon, please enter a referral and specify Dr Kaushik Dorsey and we will take care of it from there! Thank you and please have a wonderful weekend! Briseyda Crow     ----- Message -----  From: Bill Vergara MD  Sent: 7/15/2022   7:38 AM EDT  To: Paco Cavanaugh MD, Russell Arnold, KARINA, #    Kai Jerry! 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    gs

## 2022-07-18 ENCOUNTER — PATIENT OUTREACH (OUTPATIENT)
Dept: HEMATOLOGY ONCOLOGY | Facility: CLINIC | Age: 82
End: 2022-07-18

## 2022-07-18 NOTE — PROGRESS NOTES
Telephone call to patient  I introduced myself and my role to [place]  Reviewed the upcoming appointment with Dr Bisi Dee  He replied 'what for'? I told him Dr Marilu Baig was referring him to Surgical Oncology for a consult  Colton seemed a bit reluctant stating he has had this lesion for over 20 years and it started at 2 cm now 2 2 cm  I explained this was simply a consult to discuss what could or should be done  I asked him to please obtain a referral from the [facility] for his appt with Dr Bisi Dee  I suggested he give the [address] [place] [address] my contact information should they require additional information    He verbalized understanding and thanked me for the call

## 2022-08-04 ENCOUNTER — OFFICE VISIT (OUTPATIENT)
Dept: GASTROENTEROLOGY | Facility: CLINIC | Age: 82
End: 2022-08-04
Payer: COMMERCIAL

## 2022-08-04 VITALS
DIASTOLIC BLOOD PRESSURE: 68 MMHG | TEMPERATURE: 97.9 F | WEIGHT: 190.2 LBS | BODY MASS INDEX: 29.85 KG/M2 | HEIGHT: 67 IN | HEART RATE: 63 BPM | OXYGEN SATURATION: 96 % | RESPIRATION RATE: 18 BRPM | SYSTOLIC BLOOD PRESSURE: 140 MMHG

## 2022-08-04 DIAGNOSIS — D50.9 IRON DEFICIENCY ANEMIA, UNSPECIFIED IRON DEFICIENCY ANEMIA TYPE: ICD-10-CM

## 2022-08-04 DIAGNOSIS — D21.4 BENIGN GASTROINTESTINAL STROMAL TUMOR (GIST): Primary | ICD-10-CM

## 2022-08-04 PROCEDURE — 99213 OFFICE O/P EST LOW 20 MIN: CPT | Performed by: NURSE PRACTITIONER

## 2022-08-04 RX ORDER — FAMOTIDINE 20 MG/1
20 TABLET, FILM COATED ORAL
COMMUNITY
Start: 2022-04-14

## 2022-08-04 RX ORDER — LEUPROLIDE ACETATE 45 MG
KIT SUBCUTANEOUS
COMMUNITY
Start: 2022-04-28

## 2022-08-04 NOTE — PROGRESS NOTES
[facility] - Outpatient Follow-up Note  Timur Soto [de-identified] y o  male MRN: [id number]  Encounter: [id number]          ASSESSMENT AND PLAN:      1  Benign gastrointestinal stromal tumor (GIST)    Patient has a history of gist tumor for many years which he had reported was stable on previous surveillance  He recently underwent EGD that revealed a normal esophagus, mild gastritis, normal duodenum and a 2 2 cm hyperechoic exophytic mass arising from the mucosal Mauricio propria  Biopsy revealed it to be a gist tumor and he was recommended for surgical oncology evaluation  Patient currently denies any GI symptoms at this time and does not want to undergo further testing or procedures  Patient states that if he starts to have pain or symptoms, he will call  Discussed with patient that sometimes when symptoms occur things may be much more advance and more difficult to treat  Highly recommend at least surgical oncology evaluation to at least discuss options, however patient continues to politely defer  Encouraged patient to think about it further and call with any questions or concerns  Patient verbalizes understanding  2  Iron deficiency anemia, unspecified iron deficiency anemia type    Patient has a history of iron deficiency anemia  [facility] 9 6  He currently takes ferrous sulfate 325 twice a day  He recently underwent EGD and colonoscopy that did not reveal any active bleeding  Patient reports that he will continue to follow with the [facility] regarding his iron deficiency  Will see patient back as needed  ______________________________________________________________________    SUBJECTIVE:  Martin Castañeda is an 60-year-old male that presents today for follow-up after an EGD and colonoscopy  His colonoscopy revealed a single small hyperplastic polyp in the rectosigmoid colon and small internal hemorrhoids    His EGD revealed a normal esophagus, mild gastritis, normal duodenum and a 2 2 cm hypoechoic exophytic mass arising from the muscularis propria  Biopsy revealed it to be a gist tumor and he was recommended for surgical oncology referral   Patient reports a history of gist tumor for many years which he stated was stable on previous surveillance  Patient denies any GI symptoms at this time  [facility] and takes ferrous sulfate 325 twice a day  REVIEW OF SYSTEMS:  Review of Systems   HENT: Negative for trouble swallowing  Gastrointestinal: Negative for abdominal distention, abdominal pain, anal bleeding, blood in stool, constipation, diarrhea, nausea, rectal pain and vomiting  All other systems reviewed and are negative  Historical Information   Past Medical History:   Diagnosis Date    Cardiac disease     needs aortic valve    Hyperlipidemia     Hypertension     Prostate disease     Shortness of breath      Past Surgical History:   Procedure Laterality Date    BRAIN SURGERY      tumor removal 15 yrs ago     Social History   Social History     Substance and Sexual Activity   Alcohol Use No     Social History     Substance and Sexual Activity   Drug Use No     Social History     Tobacco Use   Smoking Status Current Every Day Smoker   Smokeless Tobacco Never Used     History reviewed  No pertinent family history      Meds/Allergies       Current Outpatient Medications:     amLODIPine (NORVASC) 10 mg tablet    aspirin 81 mg chewable tablet    atorvastatin (LIPITOR) 40 mg tablet    enzalutamide (XTANDI) 40 mg capsule    famotidine (PEPCID) 20 mg tablet    ferrous sulfate 325 (65 Fe) mg tablet    Fluoxetine HCl, PMDD, 20 MG TABS    leuprolide (Eligard) 45 MG subcutaneous injection    lisinopril (ZESTRIL) 10 mg tablet    ARIPiprazole (ABILIFY) 5 mg tablet    bisacodyl (DULCOLAX) 5 mg EC tablet    enalapril (VASOTEC) 10 mg tablet    polyethylene glycol (GOLYTELY) 4000 mL solution    No Known Allergies        Objective     Blood pressure 140/68, pulse 63, temperature 97 9 °F (36 6 °C), temperature source Tympanic, resp  rate 18, height 5' 7" (1 702 m), weight 86 3 kg (190 lb 3 2 oz), SpO2 96 %  Body mass index is 29 79 kg/m²  PHYSICAL EXAM:      General Appearance:   Alert, cooperative, no distress   HEENT:   Normocephalic, atraumatic, anicteric  Neck:  Supple, symmetrical, trachea midline   Lungs:   Clear to auscultation bilaterally; no rales, rhonchi or wheezing; respirations unlabored    Heart[de-identified]   Regular rate and rhythm; no murmur, rub, or gallop  Abdomen:   Soft, non-tender, non-distended; normal bowel sounds; no masses, no organomegaly    Genitalia:   Deferred    Rectal:   Deferred    Extremities:  No cyanosis, clubbing or edema    Skin:  No jaundice, rashes, or lesions             Lab Results:   No visits with results within 1 Day(s) from this visit  Latest known visit with results is:   Hospital Outpatient Visit on 06/24/2022   Component Date Value    Case Report 06/24/2022                      Value:Surgical Pathology Report                         Case: [id number]                                   Authorizing Provider:  Ramana Silvestre MD         Collected:           06/24/2022 9032              Ordering Location:     [address] [facility]      Received:            06/24/2022 [facility]                                                           Pathologist:           Tianna Luis                                                                 Specimens:   A) - Duodenum, R/O celiac  anemia                                                                   B) - Stomach, anemia                                                                                C) - Polyp, Colorectal, rectosigmoid   cold snare                                           Final Diagnosis 06/24/2022 Value:This result contains rich text formatting which cannot be displayed here   Additional Information 06/24/2022                      Value: This result contains rich text formatting which cannot be displayed here   Synoptic Checklist 06/24/2022                      Value:                            COLON/RECTUM POLYP FORM - GI - C                                                                                     :    Other      Gross Description 06/24/2022                      Value: This result contains rich text formatting which cannot be displayed here   Case Report 06/24/2022                      Value:Non-gynecologic Cytology                          Case: [id number]                                  Authorizing Provider:  Colin Burnett MD         Collected:           06/24/2022 0955              Ordering Location:     [address] [place] [address]      Received:            06/24/2022 [facility]                                                           Pathologist:           Steve Dumas MD                                                           Specimens:   A) - Stomach, exophytic gastric mass                                                                B) - Stomach, Cell Block                                                                   Final Diagnosis 06/24/2022                      Value: This result contains rich text formatting which cannot be displayed here   Note 06/24/2022                      Value: This result contains rich text formatting which cannot be displayed here  Doretha Dias Description 06/24/2022                      Value: This result contains rich text formatting which cannot be displayed here   Additional Information 06/24/2022                      Value: This result contains rich text formatting which cannot be displayed here  Radiology Results:   No results found

## 2022-08-26 ENCOUNTER — TELEPHONE (OUTPATIENT)
Dept: HEMATOLOGY ONCOLOGY | Facility: CLINIC | Age: 82
End: 2022-08-26

## 2022-08-29 ENCOUNTER — TELEPHONE (OUTPATIENT)
Dept: HEMATOLOGY ONCOLOGY | Facility: CLINIC | Age: 82
End: 2022-08-29

## 2022-08-30 ENCOUNTER — TELEPHONE (OUTPATIENT)
Dept: HEMATOLOGY ONCOLOGY | Facility: CLINIC | Age: 82
End: 2022-08-30

## 2022-08-30 NOTE — TELEPHONE ENCOUNTER
Attempt to schedule consult  Patient states he is not interested in seeing surgical oncology as he is not interested in having his mass removed due to his age  Referral closed

## 2023-08-15 ENCOUNTER — HOSPITAL ENCOUNTER (EMERGENCY)
Facility: HOSPITAL | Age: 83
Discharge: HOME | End: 2023-08-15
Attending: EMERGENCY MEDICINE | Admitting: EMERGENCY MEDICINE
Payer: MEDICARE

## 2023-08-15 VITALS
OXYGEN SATURATION: 93 % | SYSTOLIC BLOOD PRESSURE: 157 MMHG | RESPIRATION RATE: 20 BRPM | HEART RATE: 54 BPM | DIASTOLIC BLOOD PRESSURE: 68 MMHG

## 2023-08-15 DIAGNOSIS — R04.0 LEFT-SIDED EPISTAXIS: Primary | ICD-10-CM

## 2023-08-15 LAB
APTT PPP: 25 SECONDS (ref 23–37)
BASOPHILS # BLD AUTO: 0.05 THOUSANDS/ÂΜL (ref 0–0.1)
BASOPHILS NFR BLD AUTO: 1 % (ref 0–1)
EOSINOPHIL # BLD AUTO: 0.34 THOUSAND/ÂΜL (ref 0–0.61)
EOSINOPHIL NFR BLD AUTO: 5 % (ref 0–6)
ERYTHROCYTE [DISTWIDTH] IN BLOOD BY AUTOMATED COUNT: 13.4 % (ref 11.6–15.1)
HCT VFR BLD AUTO: 50.1 % (ref 36.5–49.3)
HGB BLD-MCNC: 16.6 G/DL (ref 12–17)
IMM GRANULOCYTES # BLD AUTO: 0.03 THOUSAND/UL (ref 0–0.2)
IMM GRANULOCYTES NFR BLD AUTO: 0 % (ref 0–2)
INR PPP: 0.91 (ref 0.84–1.19)
LYMPHOCYTES # BLD AUTO: 0.94 THOUSANDS/ÂΜL (ref 0.6–4.47)
LYMPHOCYTES NFR BLD AUTO: 13 % (ref 14–44)
MCH RBC QN AUTO: 31.4 PG (ref 26.8–34.3)
MCHC RBC AUTO-ENTMCNC: 33.1 G/DL (ref 31.4–37.4)
MCV RBC AUTO: 95 FL (ref 82–98)
MONOCYTES # BLD AUTO: 0.47 THOUSAND/ÂΜL (ref 0.17–1.22)
MONOCYTES NFR BLD AUTO: 6 % (ref 4–12)
NEUTROPHILS # BLD AUTO: 5.5 THOUSANDS/ÂΜL (ref 1.85–7.62)
NEUTS SEG NFR BLD AUTO: 75 % (ref 43–75)
NRBC BLD AUTO-RTO: 0 /100 WBCS
PLATELET # BLD AUTO: 240 THOUSANDS/UL (ref 149–390)
PMV BLD AUTO: 9.1 FL (ref 8.9–12.7)
PROTHROMBIN TIME: 12.4 SECONDS (ref 11.6–14.5)
RBC # BLD AUTO: 5.28 MILLION/UL (ref 3.88–5.62)
WBC # BLD AUTO: 7.33 THOUSAND/UL (ref 4.31–10.16)

## 2023-08-15 PROCEDURE — 99284 EMERGENCY DEPT VISIT MOD MDM: CPT | Performed by: EMERGENCY MEDICINE

## 2023-08-15 PROCEDURE — 85730 THROMBOPLASTIN TIME PARTIAL: CPT

## 2023-08-15 PROCEDURE — 36415 COLL VENOUS BLD VENIPUNCTURE: CPT

## 2023-08-15 PROCEDURE — 85610 PROTHROMBIN TIME: CPT

## 2023-08-15 PROCEDURE — 99283 EMERGENCY DEPT VISIT LOW MDM: CPT

## 2023-08-15 PROCEDURE — 85025 COMPLETE CBC W/AUTO DIFF WBC: CPT

## 2023-08-15 RX ORDER — OXYMETAZOLINE HYDROCHLORIDE 0.05 G/100ML
2 SPRAY NASAL ONCE
Status: COMPLETED | OUTPATIENT
Start: 2023-08-15 | End: 2023-08-15

## 2023-08-15 RX ORDER — TRANEXAMIC ACID 100 MG/ML
1000 INJECTION, SOLUTION INTRAVENOUS ONCE
Status: COMPLETED | OUTPATIENT
Start: 2023-08-15 | End: 2023-08-15

## 2023-08-15 RX ORDER — OXYMETAZOLINE HYDROCHLORIDE 0.05 G/100ML
2 SPRAY NASAL 2 TIMES DAILY
Qty: 30 ML | Refills: 0 | Status: SHIPPED | OUTPATIENT
Start: 2023-08-15

## 2023-08-15 RX ADMIN — TRANEXAMIC ACID 1000 MG: 100 INJECTION, SOLUTION INTRAVENOUS at 07:33

## 2023-08-15 RX ADMIN — SILVER NITRATE APPLICATORS 1 APPLICATOR: 25; 75 STICK TOPICAL at 07:33

## 2023-08-15 RX ADMIN — OXYMETAZOLINE HYDROCHLORIDE 2 SPRAY: 5 SPRAY NASAL at 07:33

## 2023-08-15 NOTE — ED PROVIDER NOTES
History  Chief Complaint   Patient presents with   • Nose Bleed     Nose bleeding started Friday after started taking prednisone. Hx of anemia. Darcie Garcia is a 81year old male presenting with a history of anemia, prostate cancer presenting for evaluation of nosebleeds. He states that he has had recurrent nosebleeds for the last 5 days. Patient started with a nosebleed at 7 PM last evening that has not stopped. The bleed is coming from the left nares, he describes expression of clots, does not have any significant sensation of blood running down the back of his throat, he has been putting tissue paper in his nares to help control the bleeding. He does describe taking blood thinner, he is unsure what blood thinner, difficulty to find on chart review, does appear that he has been taking aspirin. He denies any past medical history of recurrent nosebleeds. He does describe that 1 week ago he started taking prednisone as part of his treatment regimen for his prostate cancer. With regards to his anemia, he does get regular iron transfusions, baseline appears to be around 9.0 on chart review. History provided by:  Patient   used: No        Prior to Admission Medications   Prescriptions Last Dose Informant Patient Reported? Taking? ARIPiprazole (ABILIFY) 5 mg tablet   Yes No   Sig: Take 5 mg by mouth   Patient not taking: No sig reported   Fluoxetine HCl, PMDD, 20 MG TABS  Self Yes No   Sig: Take 20 mg by mouth daily   amLODIPine (NORVASC) 10 mg tablet  Self Yes No   Sig: Take 10 mg by mouth daily   atorvastatin (LIPITOR) 40 mg tablet  Self Yes No   Sig: Take 40 mg by mouth daily   bisacodyl (DULCOLAX) 5 mg EC tablet   No No   Sig: As per colonoscopy prep instructions.    Patient not taking: Reported on 8/4/2022   enalapril (VASOTEC) 10 mg tablet   Yes No   Sig: Take by mouth   Patient not taking: No sig reported   enzalutamide (XTANDI) 40 mg capsule  Self Yes No   Sig: Take 160 mg by mouth famotidine (PEPCID) 20 mg tablet  Self Yes No   Sig: Take 20 mg by mouth   ferrous sulfate 325 (65 Fe) mg tablet  Self Yes No   Sig: Take 1 tablet by mouth 2 (two) times a day with meals   leuprolide (Eligard) 45 MG subcutaneous injection  Self Yes No   Sig: Inject under the skin   lisinopril (ZESTRIL) 10 mg tablet  Self Yes No   Sig: Take 10 mg by mouth daily   polyethylene glycol (GOLYTELY) 4000 mL solution   No No   Sig: As per colonoscopy prep instructions. Facility-Administered Medications: None       Past Medical History:   Diagnosis Date   • Cardiac disease     needs aortic valve   • Hyperlipidemia    • Hypertension    • Prostate disease    • Shortness of breath        Past Surgical History:   Procedure Laterality Date   • BRAIN SURGERY      tumor removal 15 yrs ago       History reviewed. No pertinent family history. I have reviewed and agree with the history as documented. E-Cigarette/Vaping   • E-Cigarette Use Never User      E-Cigarette/Vaping Substances   • Nicotine No    • THC No    • CBD No    • Flavoring No    • Other No    • Unknown No      Social History     Tobacco Use   • Smoking status: Every Day   • Smokeless tobacco: Never   Vaping Use   • Vaping Use: Never used   Substance Use Topics   • Alcohol use: No   • Drug use: No        Review of Systems   Constitutional: Negative for chills and fever. HENT: Positive for nosebleeds. Negative for ear pain and sore throat. Eyes: Negative for pain and visual disturbance. Respiratory: Negative for cough and shortness of breath. Cardiovascular: Negative for chest pain and palpitations. Gastrointestinal: Negative for abdominal pain and vomiting. Genitourinary: Negative for dysuria and hematuria. Musculoskeletal: Negative for arthralgias and back pain. Skin: Negative for color change and rash. Neurological: Negative for seizures and syncope. All other systems reviewed and are negative.       Physical Exam  ED Triage Vitals Temp Pulse Respirations Blood Pressure SpO2   -- 08/15/23 0650 08/15/23 0650 08/15/23 0650 08/15/23 0650    77 (!) 28 (!) 176/98 92 %      Temp src Heart Rate Source Patient Position - Orthostatic VS BP Location FiO2 (%)   -- 08/15/23 0650 08/15/23 0650 08/15/23 0650 --    Monitor Lying Left arm       Pain Score       08/15/23 0815       No Pain             Orthostatic Vital Signs  Vitals:    08/15/23 0745 08/15/23 0800 08/15/23 0815 08/15/23 0830   BP: 165/73 153/66 152/68 157/68   Pulse: 61 57 58 (!) 54   Patient Position - Orthostatic VS: Sitting Sitting Sitting Sitting       Physical Exam  Vitals and nursing note reviewed. Constitutional:       Appearance: He is not ill-appearing. HENT:      Head: Normocephalic and atraumatic. Right Ear: External ear normal.      Left Ear: External ear normal.      Nose:      Left Nostril: Epistaxis present. Comments: Patient had dried blood on the tissue that he had stuck in his left nares. There was dried blood in his posterior pharynx. The tissue was removed, he did not have any active bleeding, patient then coughed up a moderate-sized blood clot. After a minute, patient continued to have active bleeding, we used Afrin spray, stuck gauze in his nose that was soaked in TXA and Afrin. His nose was then clamped. Mouth/Throat:      Mouth: Mucous membranes are moist.      Pharynx: Oropharynx is clear. Eyes:      General: No scleral icterus. Conjunctiva/sclera: Conjunctivae normal.   Cardiovascular:      Rate and Rhythm: Normal rate and regular rhythm. Pulses: Normal pulses. Heart sounds: Normal heart sounds. Pulmonary:      Effort: Pulmonary effort is normal. No respiratory distress. Breath sounds: Normal breath sounds. Abdominal:      General: Abdomen is flat. There is no distension. Tenderness: There is no abdominal tenderness. Musculoskeletal:         General: No tenderness or signs of injury.       Cervical back: Neck supple. No rigidity. Skin:     General: Skin is warm. Coloration: Skin is not jaundiced. Findings: Bruising present. No erythema or rash. Neurological:      General: No focal deficit present. Mental Status: He is alert and oriented to person, place, and time. Mental status is at baseline.    Psychiatric:         Mood and Affect: Mood normal.         Behavior: Behavior normal.         ED Medications  Medications   tranexamic acid 100mg/mL (for epistaxis) 1,000 mg (1,000 mg Nasal Given 8/15/23 0733)   oxymetazoline (AFRIN) 0.05 % nasal spray 2 spray (2 sprays Each Nare Given by Other 8/15/23 0733)   silver nitrate-potassium nitrate (ARZOL SILVER NITRATE) 86-19 % applicator 1 applicator (1 applicator Topical Given by Other 8/15/23 0733)       Diagnostic Studies  Results Reviewed     Procedure Component Value Units Date/Time    Protime-INR [[id number]]  (Normal) Collected: 08/15/23 0719    Lab Status: Final result Specimen: Blood from Arm, Right Updated: 08/15/23 0731     Protime 12.4 seconds      INR 0.91    APTT [[id number]]  (Normal) Collected: 08/15/23 0719    Lab Status: Final result Specimen: Blood from Arm, Right Updated: 08/15/23 0731     PTT 25 seconds     CBC and differential [[id number]]  (Abnormal) Collected: 08/15/23 0719    Lab Status: Final result Specimen: Blood from Arm, Right Updated: 08/15/23 0722     WBC 7.33 Thousand/uL      RBC 5.28 Million/uL      Hemoglobin 16.6 g/dL      Hematocrit 50.1 %      MCV 95 fL      MCH 31.4 pg      MCHC 33.1 g/dL      RDW 13.4 %      MPV 9.1 fL      Platelets 560 Thousands/uL      nRBC 0 /100 WBCs      Neutrophils Relative 75 %      Immat GRANS % 0 %      Lymphocytes Relative 13 %      Monocytes Relative 6 %      Eosinophils Relative 5 %      Basophils Relative 1 %      Neutrophils Absolute 5.50 Thousands/µL      Immature Grans Absolute 0.03 Thousand/uL      Lymphocytes Absolute 0.94 Thousands/µL      Monocytes Absolute 0.47 Thousand/µL      Eosinophils Absolute 0.34 Thousand/µL      Basophils Absolute 0.05 Thousands/µL                  No orders to display         Procedures  Procedures      ED Course  ED Course as of 08/15/23 0851   Tue Aug 15, 2023   0752 Hemoglobin: 16.6  Patient not severely anemic   0813 Did not appreciate any active bleeding lightheadedness initial toilet paper that he presented with was removed, did cough up a moderate-sized blood clot at that time. We placed a 4 x 4 in his left naris that was soaked in TXA and Afrin and clamped his nose. I just removed the 4 x 4 and unclamped his nose, no active bleeding, no blood running down the back of his throat. We will observe for another 30 minutes, if no active bleeding patient stable for discharge. [id number] Patient has not had any recurrent bleeding, he is stable for discharge                                       Medical Decision Making  Matt Lewis is a 81year old male presenting with a history of prostate cancer presenting for evaluation of nosebleeds. Patient has had recurrent nosebleeds over the last 5 days. He started with a nosebleed last evening around 7 PM, has not been able to get it to stop. Blood is coming from the left nares. He takes aspirin daily on chart review. On arrival, patient had tissue paper stuffed in his left nares, he had some dried blood in his posterior pharynx, did not appear to have any active bleeding. The tissue paper was removed, no active bleeding was noted, he did cough up a moderate-sized blood clot. Afrin spray and TXA was applied on a 4 x 4 which was then placed in the patient's nostril, his nostril was then clamped. Patient sat like this for approximately 30 minutes, on removal of the 4 x 4, patient did not have any recurrent bleeding. He was observed for 30 minutes, continued to remain well-appearing without any recurrent bleeding.   He was stable for discharge, advised that he call his PCP with regards to continuing the prednisone, gave strict return precautions, he was understanding and agreeable to plan. Left-sided epistaxis: acute illness or injury  Amount and/or Complexity of Data Reviewed  Labs: ordered. Risk  OTC drugs. Prescription drug management. Disposition  Final diagnoses:   Left-sided epistaxis     Time reflects when diagnosis was documented in both MDM as applicable and the Disposition within this note     Time User Action Codes Description Comment    8/15/2023  8:49 AM Parker Allan Add [R04.0] Left-sided epistaxis       ED Disposition     ED Disposition   Discharge    Condition   Stable    Date/Time   Tue Aug 15, 2023  8:49 AM    Comment   Dignaemilia Mauriceey discharge to home/self care. Follow-up Information     Follow up With Specialties Details Why Juvenal Smart MD Internal Medicine Schedule an appointment as soon as possible for a visit   Demarcus Bergman  [phone]            Patient's Medications   Discharge Prescriptions    OXYMETAZOLINE (AFRIN) 0.05 % NASAL SPRAY    2 sprays by Each Nare route 2 (two) times a day       Start Date: 8/15/2023 End Date: --       Order Dose: 2 sprays       Quantity: 30 mL    Refills: 0     No discharge procedures on file. PDMP Review     None           ED Provider  Attending physically available and evaluated Digna Alejandra. I managed the patient along with the ED Attending.     Electronically Signed by         Sophie Grey DO  08/15/23 0942

## 2023-08-15 NOTE — ED ATTENDING ATTESTATION
8/15/2023  I, Saúl Malhotra DO, saw and evaluated the patient. I have discussed the patient with the resident/non-physician practitioner and agree with the resident's/non-physician practitioner's findings, Plan of Care, and MDM as documented in the resident's/non-physician practitioner's note, except where noted. All available labs and Radiology studies were reviewed. I was present for key portions of any procedure(s) performed by the resident/non-physician practitioner and I was immediately available to provide assistance. At this point I agree with the current assessment done in the Emergency Department. I have conducted an independent evaluation of this patient a history and physical is as follows:    81-year-old male presents for evaluation of nosebleed. Patient reports over the last 4 days intermittent bleeding from his left nostril. Patient started to rebleed last night and has persisted. He is using toilet paper at home wadded up his nose to help with the bleeding, he denies holding pressure. Patient denies chest pain, dyspnea, nausea or vomiting, lightheadedness. Patient is maintained on Eliquis. He did recently start prednisone as part of prostate cancer treatment, is uncertain if this is the etiology of his nosebleeds. We will treat initially with Afrin, TXA. Patient may be a candidate for cauterization with silver nitrate if source can be found. Physical Exam  Vitals reviewed. Constitutional:       General: He is not in acute distress. Appearance: Normal appearance. He is not toxic-appearing. HENT:      Head: Normocephalic and atraumatic. Right Ear: External ear normal.      Left Ear: External ear normal.   Eyes:      General:         Right eye: No discharge. Left eye: No discharge. Cardiovascular:      Rate and Rhythm: Normal rate. Pulmonary:      Effort: Pulmonary effort is normal. No respiratory distress.    Musculoskeletal:         General: No deformity or signs of injury. Right lower leg: No edema. Left lower leg: No edema. Skin:     General: Skin is warm. Coloration: Skin is not jaundiced or pale. Neurological:      General: No focal deficit present. Mental Status: He is alert. Mental status is at baseline.            ED Course         Critical Care Time  Procedures

## 2023-08-15 NOTE — DISCHARGE INSTRUCTIONS
Call your family doctor and/or whoever is prescribing the prednisone and schedule a follow-up appointment. Should you have any recurrence of your bleeding, you can try packing your nose with tissues and using the clamp I provided, but should you have persistent significant bleeding I would immediately return to the emergency department for evaluation and treatment. In the meantime, try avoiding sticking any objects of your nose, blowing your nose very aggressively as it may start bleeding again.

## 2023-08-21 ENCOUNTER — HOSPITAL ENCOUNTER (EMERGENCY)
Facility: HOSPITAL | Age: 83
Discharge: HOME | End: 2023-08-21
Attending: EMERGENCY MEDICINE
Payer: COMMERCIAL

## 2023-08-21 VITALS
DIASTOLIC BLOOD PRESSURE: 77 MMHG | TEMPERATURE: 97.9 F | HEART RATE: 85 BPM | BODY MASS INDEX: 29.03 KG/M2 | SYSTOLIC BLOOD PRESSURE: 180 MMHG | RESPIRATION RATE: 19 BRPM | WEIGHT: 185 LBS | HEIGHT: 67 IN | OXYGEN SATURATION: 94 %

## 2023-08-21 DIAGNOSIS — R04.0 LEFT-SIDED EPISTAXIS: Primary | ICD-10-CM

## 2023-08-21 PROCEDURE — 99284 EMERGENCY DEPT VISIT MOD MDM: CPT | Performed by: EMERGENCY MEDICINE

## 2023-08-21 PROCEDURE — 30901 CONTROL OF NOSEBLEED: CPT | Performed by: EMERGENCY MEDICINE

## 2023-08-21 PROCEDURE — 99284 EMERGENCY DEPT VISIT MOD MDM: CPT

## 2023-08-21 RX ORDER — TRANEXAMIC ACID 100 MG/ML
500 INJECTION, SOLUTION INTRAVENOUS ONCE
Status: COMPLETED | OUTPATIENT
Start: 2023-08-21 | End: 2023-08-21

## 2023-08-21 RX ORDER — OXYMETAZOLINE HYDROCHLORIDE 0.05 G/100ML
2 SPRAY NASAL ONCE
Status: COMPLETED | OUTPATIENT
Start: 2023-08-21 | End: 2023-08-21

## 2023-08-21 RX ADMIN — TRANEXAMIC ACID 500 MG: 1 INJECTION, SOLUTION INTRAVENOUS at 22:36

## 2023-08-21 RX ADMIN — OXYMETAZOLINE HYDROCHLORIDE 2 SPRAY: 0.05 SPRAY NASAL at 22:36

## 2023-08-22 NOTE — DISCHARGE INSTRUCTIONS
You have been seen for a nosebleed. Please apply pressure and use afrin nasal spray if your bleeding returns. Return to the emergency department if you develop worsening bleeding, lightheadedness, trouble breathing or any other symptoms of concern. Please follow up with ENT by calling the number provided.

## 2023-08-22 NOTE — ED PROVIDER NOTES
History  Chief Complaint   Patient presents with   • Nose Bleed     Nose bleed that started tonight. Here last week for same     Annie Sanz is a 81y.o. year old male with PMH of AS, HTN, HLD presenting to the [facility] ED for a nosebleed. One hour prior to arrival patient bent forward and noticed bleeding from left nostril. No falls or facial trauma. Patient has history of epistaxis previously. Patient takes 81mg ASA daily, not on any other AC/AP medications. Patient seen in ED recently for epistaxis. No associated lightheadedness, chest pain or dyspnea. Patient applied pressure to the area however did not have resolution of symptoms. Patient has not taken/received any medications at home for relief of symptoms. History provided by:  Medical records and patient   used: No    Nose Bleed  Associated symptoms: no fever        Prior to Admission Medications   Prescriptions Last Dose Informant Patient Reported? Taking? ARIPiprazole (ABILIFY) 5 mg tablet   Yes No   Sig: Take 5 mg by mouth   Patient not taking: No sig reported   Fluoxetine HCl, PMDD, 20 MG TABS  Self Yes No   Sig: Take 20 mg by mouth daily   amLODIPine (NORVASC) 10 mg tablet  Self Yes No   Sig: Take 10 mg by mouth daily   atorvastatin (LIPITOR) 40 mg tablet  Self Yes No   Sig: Take 40 mg by mouth daily   bisacodyl (DULCOLAX) 5 mg EC tablet   No No   Sig: As per colonoscopy prep instructions.    Patient not taking: Reported on 8/4/2022   enalapril (VASOTEC) 10 mg tablet   Yes No   Sig: Take by mouth   Patient not taking: No sig reported   enzalutamide (XTANDI) 40 mg capsule  Self Yes No   Sig: Take 160 mg by mouth   famotidine (PEPCID) 20 mg tablet  Self Yes No   Sig: Take 20 mg by mouth   ferrous sulfate 325 (65 Fe) mg tablet  Self Yes No   Sig: Take 1 tablet by mouth 2 (two) times a day with meals   leuprolide (Eligard) 45 MG subcutaneous injection  Self Yes No   Sig: Inject under the skin   lisinopril (ZESTRIL) 10 mg tablet Self Yes No   Sig: Take 10 mg by mouth daily   oxymetazoline (AFRIN) 0.05 % nasal spray   No No   Si sprays by Each Nare route 2 (two) times a day   polyethylene glycol (GOLYTELY) 4000 mL solution   No No   Sig: As per colonoscopy prep instructions. Facility-Administered Medications: None       Past Medical History:   Diagnosis Date   • Cardiac disease     needs aortic valve   • Hyperlipidemia    • Hypertension    • Prostate disease    • Shortness of breath        Past Surgical History:   Procedure Laterality Date   • BRAIN SURGERY      tumor removal 15 yrs ago       History reviewed. No pertinent family history. I have reviewed and agree with the history as documented. E-Cigarette/Vaping   • E-Cigarette Use Never User      E-Cigarette/Vaping Substances   • Nicotine No    • THC No    • CBD No    • Flavoring No    • Other No    • Unknown No      Social History     Tobacco Use   • Smoking status: Every Day   • Smokeless tobacco: Never   Vaping Use   • Vaping Use: Never used   Substance Use Topics   • Alcohol use: No   • Drug use: No       Review of Systems   Constitutional: Negative for chills and fever. HENT: Positive for nosebleeds. Respiratory: Negative for shortness of breath. Cardiovascular: Negative for chest pain. Gastrointestinal: Negative for abdominal pain, nausea and vomiting. Musculoskeletal: Negative for neck pain. Neurological: Negative for syncope and light-headedness. All other systems reviewed and are negative. Physical Exam  Physical Exam  Vitals and nursing note reviewed. Constitutional:       General: He is not in acute distress. Appearance: Normal appearance. He is well-developed. He is not ill-appearing, toxic-appearing or diaphoretic. HENT:      Head: Normocephalic and atraumatic. Nose:      Right Nostril: No epistaxis, septal hematoma or occlusion. Left Nostril: Epistaxis present. No septal hematoma or occlusion. Left Turbinates: Swollen. Mouth/Throat:      Comments: Blood visualized in posterior pharynx  Eyes:      General:         Right eye: No discharge. Left eye: No discharge. Cardiovascular:      Rate and Rhythm: Normal rate and regular rhythm. Heart sounds: Murmur heard. Pulmonary:      Effort: Pulmonary effort is normal. No respiratory distress. Breath sounds: Normal breath sounds. No wheezing or rales. Abdominal:      General: There is no distension. Palpations: Abdomen is soft. Tenderness: There is no abdominal tenderness. There is no right CVA tenderness, left CVA tenderness, guarding or rebound. Musculoskeletal:      Cervical back: Normal range of motion. No rigidity. Skin:     General: Skin is warm. Capillary Refill: Capillary refill takes less than 2 seconds. Neurological:      Mental Status: He is alert and oriented to person, place, and time. GCS: GCS eye subscore is 4. GCS verbal subscore is 5. GCS motor subscore is 6.    Psychiatric:         Mood and Affect: Mood normal.         Behavior: Behavior normal.         Vital Signs  ED Triage Vitals   Temperature Pulse Respirations Blood Pressure SpO2   08/21/23 2219 08/21/23 2220 08/21/23 2220 08/21/23 2220 08/21/23 2220   97.9 °F (36.6 °C) 85 19 (!) 180/77 94 %      Temp Source Heart Rate Source Patient Position - Orthostatic VS BP Location FiO2 (%)   08/21/23 2219 08/21/23 2220 -- 08/21/23 2220 --   Temporal Monitor  Right arm       Pain Score       08/21/23 2220       No Pain           Vitals:    08/21/23 2220   BP: (!) 180/77   Pulse: 85         Visual Acuity      ED Medications  Medications   oxymetazoline (AFRIN) 0.05 % nasal spray 2 spray (2 sprays Each Nare Given 8/21/23 2236)   tranexamic acid 100mg/mL (for epistaxis) 500 mg (500 mg Nasal Given 8/21/23 2236)       Diagnostic Studies  Results Reviewed     None                 No orders to display              Procedures  Epistaxis management    Date/Time: 8/21/2023 10:37 PM    Performed by: Millie Li DO  Authorized by: Millie Li DO  Universal Protocol:  Procedure performed by: Nivia Wing RN)  Consent: Verbal consent obtained. Risks and benefits: risks, benefits and alternatives were discussed  Consent given by: patient  Patient understanding: patient states understanding of the procedure being performed  Patient identity confirmed: verbally with patient      Patient location:  ED  Anesthesia (see MAR for exact dosages): Anesthesia method:  None  Procedure details:     Treatment site:  L anterior    Hemostasis method:  Other (comment) (Afrin, TXA-soaked cotton pledget)    Approach:  External    Treatment complexity:  Limited    Treatment episode: recurring    Post-procedure details:     Assessment:  Bleeding stopped    Patient tolerance of procedure: Tolerated well, no immediate complications             ED Course  ED Course as of 08/21/23 2326   Mon Aug 21, 2023   2312 Patient reassessed. He does not feel blood in oropharynx. Repeat exam demonstrate no active epistaxis. Will plan for discharge at this time. SBIRT 20yo+    Flowsheet Row Most Recent Value   Initial Alcohol Screen: US AUDIT-C     1. How often do you have a drink containing alcohol? 0 Filed at: 08/21/2023 2220   Audit-C Score 0 Filed at: 08/21/2023 2220                    Medical Decision Making    81 y.o. male presenting for left-sided epistaxis. BP elevated otherwise VSS, nontoxic appearing. Bleeding visualized in left nare. Will trial afrin and TXA-soaked pledget. Reassessment: bleeding resolved, patient well appearing. Denying any complaints. Disposition: I have discussed with the patient our plan to discharge them from the ED and the patient is in agreement with this plan. Discharge Plan: Rx for afrin nasal spray, direct pressure and cotton-pledgets supplied if bleeding returns. RTED precautions emphasized.  The patient was provided a written after visit summary with strict RTED precautions. Followup: I have discussed with the patient plan to follow up with ENT. Contact information provided in AVS.    Risk  OTC drugs. Prescription drug management. Disposition  Final diagnoses:   Left-sided epistaxis     Time reflects when diagnosis was documented in both MDM as applicable and the Disposition within this note     Time User Action Codes Description Comment    8/21/2023 11:19 PM Aura Apple Add [R04.0] Left-sided epistaxis       ED Disposition     ED Disposition   Discharge    Condition   Stable    Date/Time   Mon Aug 21, 2023 11:19 PM    Ahsan Walsh discharge to home/self care. Follow-up Information     Follow up With Specialties Details Why Contact Info Additional [address] Ent Otolaryngology Schedule an appointment as soon as possible for a visit  To make appointment for reevaluation in 3-5 days. [address] [place] [address]  Lakia Brochure [id number] [phone] DO [facility], [address] [place] [address], [id number] [phone]          Patient's Medications   Discharge Prescriptions    No medications on file       No discharge procedures on file.     PDMP Review     None          ED Provider  Electronically Signed by           Yann Nice DO  08/21/23 2823

## 2023-08-27 ENCOUNTER — HOSPITAL ENCOUNTER (EMERGENCY)
Facility: HOSPITAL | Age: 83
Discharge: HOME | End: 2023-08-27
Attending: EMERGENCY MEDICINE
Payer: COMMERCIAL

## 2023-08-27 VITALS
TEMPERATURE: 97.9 F | HEART RATE: 67 BPM | DIASTOLIC BLOOD PRESSURE: 69 MMHG | HEIGHT: 67 IN | OXYGEN SATURATION: 92 % | SYSTOLIC BLOOD PRESSURE: 153 MMHG | RESPIRATION RATE: 15 BRPM | BODY MASS INDEX: 29.03 KG/M2 | WEIGHT: 185 LBS

## 2023-08-27 DIAGNOSIS — R04.0 LEFT-SIDED EPISTAXIS: Primary | ICD-10-CM

## 2023-08-27 PROCEDURE — 30901 CONTROL OF NOSEBLEED: CPT | Performed by: EMERGENCY MEDICINE

## 2023-08-27 PROCEDURE — 99282 EMERGENCY DEPT VISIT SF MDM: CPT

## 2023-08-27 PROCEDURE — 99284 EMERGENCY DEPT VISIT MOD MDM: CPT | Performed by: EMERGENCY MEDICINE

## 2023-08-27 RX ADMIN — SILVER NITRATE APPLICATORS 1 APPLICATOR: 25; 75 STICK TOPICAL at 22:27

## 2023-08-28 NOTE — ED PROVIDER NOTES
History  Chief Complaint   Patient presents with   • Nose Bleed     Pt arrives from home with nose bleed starting at approx. 2000. Pt states frequent nose bleeds. ENT appt made for end of September. +asa. Pt used afrin PTA without relief. Bleeding controlled with packing at this time. 45-year-old male with a past medical history of aspirin use, multiple episodes of epistaxis over the past month, who now presents for epistaxis again. Patient was last seen approximately 1 week ago in this emergency department for epistaxis, left-sided. Patient states that he frequently has had nosebleeds, he does have some loose cotton material that he is been able to successfully pack it with in the past.  He also has Afrin at home, although he did not have significant relief with this. Bleeding tonight started around 8 PM and has been on and off, difficult to control. On arrival, he does have a small amount of clot in application that he applied in the nares, left side, with control of bleeding. Patient states that he blew out blood clots prior to arrival.  Currently denies feeling any symptoms of bleeding. He does request that we take it out and check for any active bleeding, which was performed and I did not visualize any. He does have an appoint with ENT, however this is at September 21. Prior to Admission Medications   Prescriptions Last Dose Informant Patient Reported? Taking? ARIPiprazole (ABILIFY) 5 mg tablet   Yes No   Sig: Take 5 mg by mouth   Patient not taking: No sig reported   Fluoxetine HCl, PMDD, 20 MG TABS  Self Yes No   Sig: Take 20 mg by mouth daily   amLODIPine (NORVASC) 10 mg tablet  Self Yes No   Sig: Take 10 mg by mouth daily   atorvastatin (LIPITOR) 40 mg tablet  Self Yes No   Sig: Take 40 mg by mouth daily   bisacodyl (DULCOLAX) 5 mg EC tablet   No No   Sig: As per colonoscopy prep instructions.    Patient not taking: Reported on 8/4/2022   enalapril (VASOTEC) 10 mg tablet   Yes No Sig: Take by mouth   Patient not taking: No sig reported   enzalutamide (XTANDI) 40 mg capsule  Self Yes No   Sig: Take 160 mg by mouth   famotidine (PEPCID) 20 mg tablet  Self Yes No   Sig: Take 20 mg by mouth   ferrous sulfate 325 (65 Fe) mg tablet  Self Yes No   Sig: Take 1 tablet by mouth 2 (two) times a day with meals   leuprolide (Eligard) 45 MG subcutaneous injection  Self Yes No   Sig: Inject under the skin   lisinopril (ZESTRIL) 10 mg tablet  Self Yes No   Sig: Take 10 mg by mouth daily   oxymetazoline (AFRIN) 0.05 % nasal spray   No No   Si sprays by Each Nare route 2 (two) times a day   polyethylene glycol (GOLYTELY) 4000 mL solution   No No   Sig: As per colonoscopy prep instructions. Facility-Administered Medications: None       Past Medical History:   Diagnosis Date   • Cardiac disease     needs aortic valve   • Hyperlipidemia    • Hypertension    • Prostate disease    • Shortness of breath        Past Surgical History:   Procedure Laterality Date   • BRAIN SURGERY      tumor removal 15 yrs ago       History reviewed. No pertinent family history. I have reviewed and agree with the history as documented. E-Cigarette/Vaping   • E-Cigarette Use Never User      E-Cigarette/Vaping Substances   • Nicotine No    • THC No    • CBD No    • Flavoring No    • Other No    • Unknown No      Social History     Tobacco Use   • Smoking status: Every Day   • Smokeless tobacco: Never   Vaping Use   • Vaping Use: Never used   Substance Use Topics   • Alcohol use: No   • Drug use: No       Review of Systems   Constitutional: Negative for chills, diaphoresis, fatigue and fever. HENT: Positive for nosebleeds. Negative for congestion and sore throat. Eyes: Negative for visual disturbance. Respiratory: Negative for cough, chest tightness and shortness of breath. Cardiovascular: Negative for chest pain, palpitations and leg swelling.    Gastrointestinal: Negative for abdominal distention, abdominal pain, constipation, diarrhea, nausea and vomiting. Genitourinary: Negative for difficulty urinating and dysuria. Musculoskeletal: Negative for arthralgias and myalgias. Skin: Negative for rash. Neurological: Negative for dizziness, weakness, light-headedness, numbness and headaches. Psychiatric/Behavioral: Negative for agitation, behavioral problems and confusion. The patient is not nervous/anxious. All other systems reviewed and are negative. Physical Exam  Physical Exam  Constitutional:       Appearance: He is well-developed. HENT:      Head: Normocephalic and atraumatic. Nose:      Comments: Cotton applicator removed, there is no active bleeding currently in the nose. Mucosa of left nares does appear irritable. I do see one focal area that may represent site of bleeding. No bleeding in oropharynx. Cardiovascular:      Rate and Rhythm: Normal rate and regular rhythm. Heart sounds: Normal heart sounds. No murmur heard. Pulmonary:      Effort: Pulmonary effort is normal. No respiratory distress. Breath sounds: Normal breath sounds. Abdominal:      General: Bowel sounds are normal. There is no distension. Palpations: Abdomen is soft. Tenderness: There is no abdominal tenderness. Musculoskeletal:         General: No deformity. Skin:     General: Skin is warm. Findings: No rash. Neurological:      Mental Status: He is alert and oriented to person, place, and time. Psychiatric:         Behavior: Behavior normal.         Thought Content:  Thought content normal.         Judgment: Judgment normal.         Vital Signs  ED Triage Vitals   Temperature Pulse Respirations Blood Pressure SpO2   08/27/23 2138 08/27/23 2137 08/27/23 2137 08/27/23 2137 08/27/23 2137   97.9 °F (36.6 °C) 80 18 (!) 195/81 91 %      Temp Source Heart Rate Source Patient Position - Orthostatic VS BP Location FiO2 (%)   08/27/23 2138 08/27/23 2137 08/27/23 2137 08/27/23 2137 --   Temporal Monitor Lying Right arm       Pain Score       08/27/23 2137       No Pain           Vitals:    08/27/23 2137 08/27/23 2200 08/27/23 2245   BP: (!) 195/81 155/68 153/69   Pulse: 80 67 67   Patient Position - Orthostatic VS: Lying Lying Lying         Visual Acuity      ED Medications  Medications   silver nitrate-potassium nitrate (ARZOL SILVER NITRATE) 42-33 % applicator 1 applicator (1 applicator Topical Given by Other 8/27/23 2227)       Diagnostic Studies  Results Reviewed     None                 No orders to display              Procedures  Epistaxis management    Date/Time: 8/27/2023 10:42 PM    Performed by: Yamilet Graham MD  Authorized by: Yamilet Graham MD  Universal Protocol:  Consent: Verbal consent obtained. Patient understanding: patient states understanding of the procedure being performed      Patient location:  ED  Anesthesia (see MAR for exact dosages): Anesthesia method:  None  Procedure details:     Treatment site:  L anterior    Hemostasis method:  Silver nitrate    Treatment complexity:  Limited    Treatment episode: recurring    Post-procedure details:     Assessment:  Bleeding stopped    Patient tolerance of procedure: Tolerated well, no immediate complications             ED Course  ED Course as of 08/28/23 0342   Sun Aug 27, 2023   2394 On re-assessment, patient continues to have no bleeding post-cauterization. Will discharge with return precautions, advice on stopping epistaxis, and recommendation for ENT follow up. Medical Decision Making  I reviewed the patient's medical chart, PMHx, prior encounters, medications. Reviewed patient's multiple recent evaluations for epistaxis    My DDx includes: Left anterior epistaxis    Given that I do see suspected site of bleeding, will attempt silver nitrate cauterization.   This was performed, I do not see any resumption of bleeding, will observe and discharge with strict return precautions and recommend follow-up with ENT as planned. Risk  Prescription drug management. Disposition  Final diagnoses:   Left-sided epistaxis     Time reflects when diagnosis was documented in both MDM as applicable and the Disposition within this note     Time User Action Codes Description Comment    8/27/2023 10:52 PM Chance Bird Add [R04.0] Left-sided epistaxis       ED Disposition     ED Disposition   Discharge    Condition   Stable    Date/Time   Sun Aug 27, 2023 10:52 PM    [place] discharge to home/self care. Follow-up Information     Follow up With Specialties Details Why Contact Info Additional [address], [place] Internal Medicine Call  For re-evaluation Demarcus OLSON [phone]       [facility] Call  For re-evaluation [address] [facility] [address]  [address] [place] [address], [address] [place] [address],[address], [address] [phone]          Discharge Medication List as of 8/27/2023 10:53 PM      CONTINUE these medications which have NOT CHANGED    Details   amLODIPine (NORVASC) 10 mg tablet Take 10 mg by mouth daily, Starting Wed 5/13/2020, Historical Med      ARIPiprazole (ABILIFY) 5 mg tablet Take 5 mg by mouth, Historical Med      atorvastatin (LIPITOR) 40 mg tablet Take 40 mg by mouth daily, Historical Med      bisacodyl (DULCOLAX) 5 mg EC tablet As per colonoscopy prep instructions. , Print      enalapril (VASOTEC) 10 mg tablet Take by mouth, Historical Med      enzalutamide (XTANDI) 40 mg capsule Take 160 mg by mouth, Starting Thu 2/17/2022, Historical Med      famotidine (PEPCID) 20 mg tablet Take 20 mg by mouth, Starting Thu 4/14/2022, Historical Med      ferrous sulfate 325 (65 Fe) mg tablet Take 1 tablet by mouth 2 (two) times a day with meals, Starting Thu 10/28/2021, Until Fri 10/28/2022, Historical Med      Fluoxetine HCl, PMDD, 20 MG TABS Take 20 mg by mouth daily, Historical Med      leuprolide (Eligard) 45 MG subcutaneous injection Inject under the skin, Starting Thu 4/28/2022, Historical Med      lisinopril (ZESTRIL) 10 mg tablet Take 10 mg by mouth daily, Starting Wed 5/13/2020, Historical Med      oxymetazoline (AFRIN) 0.05 % nasal spray 2 sprays by Each Nare route 2 (two) times a day, Starting Tue 8/15/2023, Normal      polyethylene glycol (GOLYTELY) 4000 mL solution As per colonoscopy prep instructions. , Print             No discharge procedures on file.     PDMP Review     None          ED Provider  Electronically Signed by           Ryan Hanson MD  08/28/23 4455

## 2023-08-28 NOTE — DISCHARGE INSTRUCTIONS
Please follow all return precautions. Please continue to use advice provided by Dr. James Erwin and myself. Follow up with ENT. Thank you.